# Patient Record
Sex: FEMALE | Race: WHITE | Employment: FULL TIME | URBAN - METROPOLITAN AREA
[De-identification: names, ages, dates, MRNs, and addresses within clinical notes are randomized per-mention and may not be internally consistent; named-entity substitution may affect disease eponyms.]

---

## 2021-10-27 ENCOUNTER — OFFICE VISIT (OUTPATIENT)
Dept: PRIMARY CARE CLINIC | Age: 31
End: 2021-10-27
Payer: COMMERCIAL

## 2021-10-27 VITALS
TEMPERATURE: 98 F | BODY MASS INDEX: 27.06 KG/M2 | HEIGHT: 70 IN | SYSTOLIC BLOOD PRESSURE: 118 MMHG | WEIGHT: 189 LBS | HEART RATE: 146 BPM | DIASTOLIC BLOOD PRESSURE: 78 MMHG | OXYGEN SATURATION: 98 %

## 2021-10-27 DIAGNOSIS — R10.30 LOWER ABDOMINAL PAIN: ICD-10-CM

## 2021-10-27 DIAGNOSIS — R10.30 LOWER ABDOMINAL PAIN: Primary | ICD-10-CM

## 2021-10-27 LAB
BILIRUBIN, POC: NORMAL
BLOOD URINE, POC: NORMAL
CLARITY, POC: CLEAR
COLOR, POC: YELLOW
GLUCOSE URINE, POC: NORMAL
KETONES, POC: NORMAL
LEUKOCYTE EST, POC: NORMAL
NITRITE, POC: NORMAL
PH, POC: 6
PROTEIN, POC: NORMAL
SPECIFIC GRAVITY, POC: >=1.03
UROBILINOGEN, POC: NORMAL

## 2021-10-27 PROCEDURE — 81002 URINALYSIS NONAUTO W/O SCOPE: CPT | Performed by: NURSE PRACTITIONER

## 2021-10-27 PROCEDURE — 99213 OFFICE O/P EST LOW 20 MIN: CPT | Performed by: NURSE PRACTITIONER

## 2021-10-27 RX ORDER — DESOGESTREL AND ETHINYL ESTRADIOL 0.15-0.03
KIT ORAL
COMMUNITY
Start: 2021-09-27

## 2021-10-27 RX ORDER — CETIRIZINE HYDROCHLORIDE 5 MG/1
TABLET ORAL
COMMUNITY

## 2021-10-27 NOTE — PROGRESS NOTES
Chief Complaint:       Abdominal Pain (bloating and lower abd pain this AM)      History of Present Illness   Source of history provided by:  patient. Ghazala Melvin is a 36 y.o. old female who has a past medical history of: History reviewed. No pertinent past medical history. Presents to the [facility] care for complaints of abdominal cramping while going to the bathroom at work today. She does have a history of pelvic floor dysfunction. Reports that the pain is now resolved abdominal pain located over the bladder region which began earlier today. Her next menstrual cycle is supposed to start in 3-4 days. Describes the pain as sharp and debilitating. States the pain does not radiate. Reports associated nausea, but denies any vomiting or diarrhea. Has tried taking pamprin OTC without symptomatic relief. Denies any fever, chills, CP, SOB, dysuria, hematuria, change in stool color/consistency, HA, sore throat, rash, or lethargy. ROS    Unless otherwise stated in this report or unable to obtain because of the patient's clinical or mental status as evidenced by the medical record, this patients's positive and negative responses for Review of Systems, constitutional, psych, eyes, ENT, cardiovascular, respiratory, gastrointestinal, neurological, genitourinary, musculoskeletal, integument systems and systems related to the presenting problem are either stated in the preceding or were not pertinent or were negative for the symptoms and/or complaints related to the medical problem. Past Medical History: History reviewed. No pertinent surgical history. Social History:  reports that she has never smoked. She has never used smokeless tobacco.  Family History: family history is not on file.    Allergies: Amoxicillin, Cefdinir, Doxycycline, Azithromycin, Nystatin, and Prednisone    Physical Exam         VS:  /78   Pulse 146   Temp 98 °F (36.7 °C)   Ht 5' 10\" (1.778 m)   Wt 189 lb (85.7 kg)   LMP 09/29/2021

## 2021-10-28 LAB — URINE CULTURE, ROUTINE: NORMAL

## 2021-10-30 LAB — URINE CULTURE, ROUTINE: NORMAL

## 2021-12-03 ENCOUNTER — OFFICE VISIT (OUTPATIENT)
Dept: PRIMARY CARE CLINIC | Age: 31
End: 2021-12-03
Payer: COMMERCIAL

## 2021-12-03 ENCOUNTER — HOSPITAL ENCOUNTER (OUTPATIENT)
Dept: GENERAL RADIOLOGY | Age: 31
Discharge: HOME | End: 2021-12-05
Payer: COMMERCIAL

## 2021-12-03 ENCOUNTER — HOSPITAL ENCOUNTER (OUTPATIENT)
Age: 31
Discharge: HOME | End: 2021-12-03
Payer: COMMERCIAL

## 2021-12-03 VITALS
HEART RATE: 100 BPM | TEMPERATURE: 98 F | DIASTOLIC BLOOD PRESSURE: 80 MMHG | WEIGHT: 189 LBS | HEIGHT: 70 IN | OXYGEN SATURATION: 98 % | BODY MASS INDEX: 27.06 KG/M2 | SYSTOLIC BLOOD PRESSURE: 130 MMHG

## 2021-12-03 DIAGNOSIS — J45.31 MILD PERSISTENT ASTHMA WITH ACUTE EXACERBATION: Primary | ICD-10-CM

## 2021-12-03 DIAGNOSIS — J45.31 MILD PERSISTENT ASTHMA WITH ACUTE EXACERBATION: ICD-10-CM

## 2021-12-03 PROCEDURE — 71046 X-RAY EXAM CHEST 2 VIEWS: CPT

## 2021-12-03 PROCEDURE — 99213 OFFICE O/P EST LOW 20 MIN: CPT | Performed by: FAMILY MEDICINE

## 2021-12-03 RX ORDER — ALBUTEROL SULFATE 90 UG/1
2 AEROSOL, METERED RESPIRATORY (INHALATION) EVERY 4 HOURS PRN
Qty: 18 G | Refills: 1 | Status: SHIPPED | OUTPATIENT
Start: 2021-12-03

## 2021-12-03 RX ORDER — DEXAMETHASONE 1 MG
3 TABLET ORAL 2 TIMES DAILY WITH MEALS
Qty: 24 TABLET | Refills: 0 | Status: SHIPPED | OUTPATIENT
Start: 2021-12-03 | End: 2021-12-07

## 2021-12-03 RX ORDER — DEXAMETHASONE 1 MG
3 TABLET ORAL 2 TIMES DAILY WITH MEALS
Qty: 24 TABLET | Refills: 0 | Status: SHIPPED
Start: 2021-12-03 | End: 2021-12-03 | Stop reason: SDUPTHER

## 2021-12-03 RX ORDER — FOLIC ACID 1 MG/1
1 TABLET ORAL DAILY
COMMUNITY

## 2021-12-03 NOTE — PROGRESS NOTES
(All laboratory and radiology results have been personally reviewed by myself)  Labs:  No results found for this visit on 12/03/21. Imaging: All Radiology results interpreted by Radiologist unless otherwise noted. No results found. Medical Decision Making   Pt non-toxic, in no apparent distress and stable at time of discharge. Assessment/Plan   Cuca was seen today for cough, congestion, shortness of breath and wheezing. Diagnoses and all orders for this visit:    Mild persistent asthma with acute exacerbation  -     XR CHEST (2 VW); Future  -     albuterol sulfate HFA (VENTOLIN HFA) 108 (90 Base) MCG/ACT inhaler; Inhale 2 puffs into the lungs every 4 hours as needed for Wheezing or Shortness of Breath . OK substitute      CXR r/o PNA, doubt clinically. Qvar 2-3 times per day. Reviewed albuterol use PRN and to use 3-5 times per day while sick. She will start these and follow up on CXR report. She will call if this is not helping to trial steroid (medrol or decadron). She'd like to wait on steroid script due to previous adverse reaction to prednisone. Increase fluids and rest. Symptomatic relief discussed including Tylenol prn pain/fever. Schedule f/u with PCP in 7-10 days if symptoms persist. ED sooner if symptoms worsen or change. ED immediately with high or refractory fever, progressive SOB, dyspnea, CP, calf pain/swelling, shaking chills, vomiting, abdominal pain, lethargy, flank pain, or decreased urinary output. Pt verbalizes understanding and is in agreement with plan of care. All questions answered. Matt Wills MD    This visit was provided as a focused evaluation during the COVID -19 pandemic/national emergency. A comprehensive review of all previous patient history and testing was not conducted. Pertinent findings were elicited during the visit. *NOTE: This report was transcribed using voice recognition software.  Every effort was made to ensure accuracy; however, inadvertent computerized transcription errors may be present.

## 2021-12-03 NOTE — RESULT ENCOUNTER NOTE
No evidence of lobar/bacterial pneumonia. There is bronchial inflammation which we discussed in visit. Would be reasonable to use the inhalers as reviewed and if she wants I can send one of the steroids to have on hand to try if she feels she's getting worse.

## 2023-03-08 ENCOUNTER — OFFICE VISIT (OUTPATIENT)
Dept: PRIMARY CARE | Facility: CLINIC | Age: 33
End: 2023-03-08
Payer: COMMERCIAL

## 2023-03-08 VITALS
HEART RATE: 98 BPM | HEIGHT: 70 IN | DIASTOLIC BLOOD PRESSURE: 68 MMHG | OXYGEN SATURATION: 99 % | SYSTOLIC BLOOD PRESSURE: 116 MMHG | BODY MASS INDEX: 30.06 KG/M2 | TEMPERATURE: 97.7 F | WEIGHT: 210 LBS

## 2023-03-08 DIAGNOSIS — R04.2 BLOOD-STREAKED SPUTUM: ICD-10-CM

## 2023-03-08 DIAGNOSIS — J02.9 SORE THROAT: Primary | ICD-10-CM

## 2023-03-08 DIAGNOSIS — J45.30 MILD PERSISTENT ASTHMA WITHOUT COMPLICATION (HHS-HCC): ICD-10-CM

## 2023-03-08 PROCEDURE — 1036F TOBACCO NON-USER: CPT | Performed by: FAMILY MEDICINE

## 2023-03-08 PROCEDURE — 99213 OFFICE O/P EST LOW 20 MIN: CPT | Performed by: FAMILY MEDICINE

## 2023-03-08 RX ORDER — ALBUTEROL SULFATE 90 UG/1
2 AEROSOL, METERED RESPIRATORY (INHALATION) EVERY 4 HOURS PRN
COMMUNITY
End: 2024-02-28 | Stop reason: SDUPTHER

## 2023-03-08 RX ORDER — BECLOMETHASONE DIPROPIONATE HFA 80 UG/1
2 AEROSOL, METERED RESPIRATORY (INHALATION) DAILY
COMMUNITY
Start: 2021-02-01 | End: 2024-04-08 | Stop reason: WASHOUT

## 2023-03-08 RX ORDER — FOLIC ACID 1 MG/1
1 TABLET ORAL DAILY
COMMUNITY

## 2023-03-08 RX ORDER — MILK THISTLE 150 MG
1 CAPSULE ORAL DAILY PRN
COMMUNITY
End: 2024-02-05 | Stop reason: WASHOUT

## 2023-03-08 RX ORDER — MULTIVITAMIN
1 TABLET ORAL DAILY
COMMUNITY
End: 2024-02-05

## 2023-03-08 ASSESSMENT — PATIENT HEALTH QUESTIONNAIRE - PHQ9
1. LITTLE INTEREST OR PLEASURE IN DOING THINGS: NOT AT ALL
SUM OF ALL RESPONSES TO PHQ9 QUESTIONS 1 AND 2: 0
2. FEELING DOWN, DEPRESSED OR HOPELESS: NOT AT ALL

## 2023-03-08 ASSESSMENT — ENCOUNTER SYMPTOMS
FATIGUE: 1
SINUS PAIN: 0
CHILLS: 0
ACTIVITY CHANGE: 1
FEVER: 0

## 2023-03-08 NOTE — PROGRESS NOTES
"Subjective   Patient ID: Marisol Rebolledo is a 32 y.o. female who presents for Nasal Congestion (Woke up yesterday with blood in her phlegm, no cough).  She had blood in her phlegm x2 just first thing in the morning.  Small amount.  States her throat is very sore in the morning and throughout the day.  Sore all the way in the tracheal area.  No shortness of breath or aggravation of asthma.  Denies fever chills.  Does admit to feeling somewhat fatigued.  She had some questions concern regarding use of her inhalers if she would become pregnant.  Denies nasal congestion or epistaxis.  States she did see ENT in the past and told she was fine    HPI     Review of Systems   Constitutional:  Positive for activity change and fatigue. Negative for chills and fever.   HENT:  Negative for congestion and sinus pain.        Objective   /68   Pulse 98   Temp 36.5 °C (97.7 °F)   Ht 1.778 m (5' 10\")   Wt 95.3 kg (210 lb)   SpO2 99%   BMI 30.13 kg/m²     Physical Exam  Constitutional:       Appearance: Normal appearance. She is obese.   HENT:      Head: Normocephalic.      Right Ear: Tympanic membrane and ear canal normal.      Left Ear: Tympanic membrane and ear canal normal.      Nose: No congestion or rhinorrhea.      Comments: Nasal passages are very narrow it does appear that the septum is deviated to the left.  No obvious area of bleeding.  Pharynx is very irritated and erythematous tonsils 1+ no exudate     Mouth/Throat:      Mouth: Mucous membranes are moist.      Pharynx: Oropharynx is clear. Posterior oropharyngeal erythema present. No oropharyngeal exudate.   Cardiovascular:      Rate and Rhythm: Normal rate and regular rhythm.      Heart sounds: No murmur heard.  Pulmonary:      Effort: No respiratory distress.      Breath sounds: No wheezing, rhonchi or rales.   Musculoskeletal:      Cervical back: No rigidity.   Lymphadenopathy:      Cervical: No cervical adenopathy.   Neurological:      Mental Status: She is " alert.         Assessment/Plan   Problem List Items Addressed This Visit    None  Visit Diagnoses       Sore throat    -  Primary    Blood-streaked sputum        Mild persistent asthma without complication            Patient with significant sore throat.  Most likely viral in etiology versus possibly just indoor dry air patient very old house built in the 1920s forced air heat.  Encourage use of humidifier closer to the bed.  Aggressive use of nasal saline.  If she continues to get blood in her sputum or would develop shortness of breath should follow-up.  Would initially consider seeing ENT for nasopharyngoscopy do not feel it is lung related.   Discussed use of her inhalers in pregnancy would continue very small amount of medicine and very important to control asthma during pregnancy.

## 2023-03-10 ENCOUNTER — TELEPHONE (OUTPATIENT)
Dept: PRIMARY CARE | Facility: CLINIC | Age: 33
End: 2023-03-10

## 2023-03-10 NOTE — TELEPHONE ENCOUNTER
Pt called said that she went outside she thought she was better and now she has developed a dry cough. She was wondering if taking a steroid would help with it she has some at her house.

## 2023-03-10 NOTE — TELEPHONE ENCOUNTER
Called and informed yes steroid would probably help she has asthma. Her bleeding has resolved. Otherwise feels well. Spent a lot of time outside .

## 2023-03-13 ENCOUNTER — TELEPHONE (OUTPATIENT)
Dept: PRIMARY CARE | Facility: CLINIC | Age: 33
End: 2023-03-13

## 2023-03-13 DIAGNOSIS — J01.00 ACUTE NON-RECURRENT MAXILLARY SINUSITIS: Primary | ICD-10-CM

## 2023-03-13 RX ORDER — DEXAMETHASONE 4 MG/1
4 TABLET ORAL 2 TIMES DAILY
Qty: 10 TABLET | Refills: 0 | Status: SHIPPED | OUTPATIENT
Start: 2023-03-13 | End: 2023-05-04 | Stop reason: SDUPTHER

## 2023-03-13 RX ORDER — LEVOFLOXACIN 500 MG/1
500 TABLET, FILM COATED ORAL DAILY
Qty: 10 TABLET | Refills: 0 | Status: SHIPPED | OUTPATIENT
Start: 2023-03-13 | End: 2023-03-23 | Stop reason: SINTOL

## 2023-03-13 NOTE — TELEPHONE ENCOUNTER
Called and discussed with patient allergies to all antibiotic groups other than danii quinolones.  She states she has done levofloxacin in the past.  She is informed using that with steroids significant increased risk of tendon rupture.  Patient understands she will take.  She has almost completely lost her voice states she is not coughing up abundant green material.     Called discussed with her Rx sent

## 2023-03-23 ENCOUNTER — TELEMEDICINE (OUTPATIENT)
Dept: PRIMARY CARE | Facility: CLINIC | Age: 33
End: 2023-03-23
Payer: COMMERCIAL

## 2023-03-23 DIAGNOSIS — T50.905A MEDICATION REACTION, INITIAL ENCOUNTER: ICD-10-CM

## 2023-03-23 DIAGNOSIS — M79.605 LEG PAIN, BILATERAL: Primary | ICD-10-CM

## 2023-03-23 DIAGNOSIS — M79.604 LEG PAIN, BILATERAL: Primary | ICD-10-CM

## 2023-03-23 PROCEDURE — 99213 OFFICE O/P EST LOW 20 MIN: CPT | Performed by: FAMILY MEDICINE

## 2023-03-24 NOTE — PROGRESS NOTES
Subjective   Virtual visit   18 minutes visit   Called back after visit 2.5 minutes   Patient ID: Marisol Rebolledo is a 32 y.o. female who presents for No chief complaint on file..    HPI virtual visit with patient she actually was sitting in a [employer] parking lot when she called  Onset of severe pain yesterday seem to improve some during the day last night however became severe again  Primarily knee down both legs pain and muscles tendons  Patient note is on levofloxacin she does take her dose around 10 10:30 at night pain seems to be the worse early in the morning she did take her dose last night  She denies any rash or swelling  She actually was not able to walk this morning  Denies that her swollen  Just painful  She denies any shortness of breath no swelling in her lips no wheezing  Does have history of asthma    Review of Systems    Objective   There were no vitals taken for this visit.    Physical Exam    Assessment/Plan   Problem List Items Addressed This Visit    None  Visit Diagnoses       Leg pain, bilateral    -  Primary    Medication reaction, initial encounter            Discussed with patient this is felt to be reaction to levofloxacin.  Encouraged her to try taking ibuprofen 3 of them every 6 hours with food.  Also should consider taking Prilosec 20 mg/day needed for GI protection.  Informed her may take a while for symptoms to improve she is to call if any other change occurs particularly rash fever swelling.   Highly encouraged her to have visit with allergist to be tested as she is allergic to all common antibiotics that could be used.

## 2023-05-04 ENCOUNTER — TELEPHONE (OUTPATIENT)
Dept: PRIMARY CARE | Facility: CLINIC | Age: 33
End: 2023-05-04

## 2023-05-04 DIAGNOSIS — J45.30 MILD PERSISTENT ASTHMA WITHOUT COMPLICATION (HHS-HCC): Primary | ICD-10-CM

## 2023-05-04 DIAGNOSIS — B37.31 CANDIDA VAGINITIS: ICD-10-CM

## 2023-05-04 DIAGNOSIS — J45.21 MILD INTERMITTENT ASTHMA WITH EXACERBATION (HHS-HCC): ICD-10-CM

## 2023-05-04 RX ORDER — DEXAMETHASONE 4 MG/1
4 TABLET ORAL 2 TIMES DAILY
Qty: 10 TABLET | Refills: 0 | Status: SHIPPED | OUTPATIENT
Start: 2023-05-04 | End: 2023-05-15 | Stop reason: ALTCHOICE

## 2023-05-04 NOTE — TELEPHONE ENCOUNTER
Pt has no insurance and can't afford a visit or a phone visit. Wants to let you know that she is allergic to to antibiotics, can't breathe, severe asthma and has been off work for 3 days.  She wants you to call and talk with her.

## 2023-05-11 PROBLEM — B37.0 ORAL THRUSH: Status: ACTIVE | Noted: 2023-05-11

## 2023-05-11 RX ORDER — FLUCONAZOLE 150 MG/1
150 TABLET ORAL ONCE
Qty: 2 TABLET | Refills: 0 | Status: SHIPPED | OUTPATIENT
Start: 2023-05-11 | End: 2023-05-15 | Stop reason: SDUPTHER

## 2023-05-15 ENCOUNTER — OFFICE VISIT (OUTPATIENT)
Dept: PRIMARY CARE | Facility: CLINIC | Age: 33
End: 2023-05-15
Payer: COMMERCIAL

## 2023-05-15 VITALS
WEIGHT: 211 LBS | DIASTOLIC BLOOD PRESSURE: 72 MMHG | OXYGEN SATURATION: 98 % | HEART RATE: 88 BPM | BODY MASS INDEX: 30.21 KG/M2 | SYSTOLIC BLOOD PRESSURE: 128 MMHG | HEIGHT: 70 IN

## 2023-05-15 DIAGNOSIS — J45.21 MILD INTERMITTENT ASTHMA WITH EXACERBATION (HHS-HCC): ICD-10-CM

## 2023-05-15 DIAGNOSIS — R49.0 HOARSENESS OF VOICE: Primary | ICD-10-CM

## 2023-05-15 PROBLEM — N89.8 VAGINAL DISCHARGE: Status: RESOLVED | Noted: 2023-05-15 | Resolved: 2023-05-15

## 2023-05-15 PROBLEM — R06.02 SHORTNESS OF BREATH: Status: RESOLVED | Noted: 2023-05-15 | Resolved: 2023-05-15

## 2023-05-15 PROBLEM — N92.0 HEAVY PERIODS: Status: RESOLVED | Noted: 2023-05-15 | Resolved: 2023-05-15

## 2023-05-15 PROBLEM — J45.30 MILD PERSISTENT ASTHMA (HHS-HCC): Status: ACTIVE | Noted: 2023-05-15

## 2023-05-15 PROBLEM — R23.2 FACIAL FLUSHING: Status: RESOLVED | Noted: 2023-05-15 | Resolved: 2023-05-15

## 2023-05-15 PROBLEM — M41.80 DEXTROSCOLIOSIS: Status: ACTIVE | Noted: 2023-05-15

## 2023-05-15 PROBLEM — R07.9 CHEST PAIN: Status: RESOLVED | Noted: 2023-05-15 | Resolved: 2023-05-15

## 2023-05-15 PROBLEM — J45.909 ASTHMA (HHS-HCC): Status: ACTIVE | Noted: 2023-05-15

## 2023-05-15 PROBLEM — H53.129 SCINTILLATING SCOTOMA: Status: RESOLVED | Noted: 2023-05-15 | Resolved: 2023-05-15

## 2023-05-15 PROBLEM — J20.9 ACUTE BRONCHITIS: Status: RESOLVED | Noted: 2023-05-15 | Resolved: 2023-05-15

## 2023-05-15 PROBLEM — H91.90 HEARING LOSS: Status: RESOLVED | Noted: 2023-05-15 | Resolved: 2023-05-15

## 2023-05-15 PROBLEM — N94.6 MENSTRUAL PAIN: Status: RESOLVED | Noted: 2023-05-15 | Resolved: 2023-05-15

## 2023-05-15 PROBLEM — R27.8 MUSCULAR INCOORDINATION: Status: RESOLVED | Noted: 2019-11-13 | Resolved: 2023-05-15

## 2023-05-15 PROBLEM — M26.629 TMJ SYNDROME: Status: ACTIVE | Noted: 2023-05-15

## 2023-05-15 PROBLEM — M79.675 PAIN OF TOE OF LEFT FOOT: Status: RESOLVED | Noted: 2023-05-15 | Resolved: 2023-05-15

## 2023-05-15 PROBLEM — M21.42 PES PLANUS OF LEFT FOOT: Status: RESOLVED | Noted: 2023-05-15 | Resolved: 2023-05-15

## 2023-05-15 PROBLEM — R19.8 ALTERNATING CONSTIPATION AND DIARRHEA: Status: RESOLVED | Noted: 2023-05-15 | Resolved: 2023-05-15

## 2023-05-15 PROBLEM — R04.0 BLEEDING NOSE: Status: RESOLVED | Noted: 2023-05-15 | Resolved: 2023-05-15

## 2023-05-15 PROBLEM — R42 VERTIGO: Status: RESOLVED | Noted: 2023-05-15 | Resolved: 2023-05-15

## 2023-05-15 PROBLEM — B35.1 ONYCHOMYCOSIS OF TOENAIL: Status: RESOLVED | Noted: 2023-05-15 | Resolved: 2023-05-15

## 2023-05-15 PROBLEM — M62.838 SPASM OF MUSCLE: Status: RESOLVED | Noted: 2019-11-13 | Resolved: 2023-05-15

## 2023-05-15 PROBLEM — R42 DIZZINESS: Status: RESOLVED | Noted: 2023-05-15 | Resolved: 2023-05-15

## 2023-05-15 PROBLEM — B37.9 YEAST INFECTION: Status: RESOLVED | Noted: 2023-05-15 | Resolved: 2023-05-15

## 2023-05-15 PROBLEM — N64.4 BREAST TENDERNESS IN FEMALE: Status: RESOLVED | Noted: 2023-05-15 | Resolved: 2023-05-15

## 2023-05-15 PROBLEM — J30.9 ALLERGIC RHINITIS: Status: ACTIVE | Noted: 2023-05-15

## 2023-05-15 PROBLEM — S29.011A PECTORALIS MUSCLE STRAIN: Status: RESOLVED | Noted: 2023-05-15 | Resolved: 2023-05-15

## 2023-05-15 PROBLEM — H92.02 LEFT EAR PAIN: Status: RESOLVED | Noted: 2023-05-15 | Resolved: 2023-05-15

## 2023-05-15 PROBLEM — R10.9 LEFT FLANK PAIN: Status: RESOLVED | Noted: 2023-05-15 | Resolved: 2023-05-15

## 2023-05-15 PROBLEM — Q66.51 CONGENITAL PES PLANUS OF RIGHT FOOT: Status: ACTIVE | Noted: 2023-05-15

## 2023-05-15 PROBLEM — H93.13 BILATERAL TINNITUS: Status: ACTIVE | Noted: 2023-05-15

## 2023-05-15 PROBLEM — R05.9 COUGH: Status: RESOLVED | Noted: 2023-05-15 | Resolved: 2023-05-15

## 2023-05-15 PROBLEM — H81.10 BENIGN PAROXYSMAL POSITIONAL VERTIGO: Status: ACTIVE | Noted: 2023-05-15

## 2023-05-15 PROCEDURE — 1036F TOBACCO NON-USER: CPT | Performed by: FAMILY MEDICINE

## 2023-05-15 PROCEDURE — 99213 OFFICE O/P EST LOW 20 MIN: CPT | Performed by: FAMILY MEDICINE

## 2023-05-15 RX ORDER — FLUCONAZOLE 150 MG/1
150 TABLET ORAL ONCE
Qty: 2 TABLET | Refills: 0 | Status: SHIPPED | OUTPATIENT
Start: 2023-05-15 | End: 2023-05-16 | Stop reason: ALTCHOICE

## 2023-05-15 RX ORDER — DEXAMETHASONE 4 MG/1
4 TABLET ORAL 2 TIMES DAILY
Qty: 10 TABLET | Refills: 0 | Status: SHIPPED | OUTPATIENT
Start: 2023-05-15 | End: 2024-02-05 | Stop reason: WASHOUT

## 2023-05-15 NOTE — PROGRESS NOTES
Subjective   Patient ID: Marisol Rebolledo is a 32 y.o. female who presents for no voice (Complaining of not having a voice, for about 3 weeks ).  HPI  Has been hoarse for 3 weeks   Started with a cold  Still coughing up mucus  ? SOB  No CP, ST, ear pain  Fever, chills resolved  No runny/stuffy nose  No nausea, diarrhea  Coughed so much that vomited  Home COVID test negative  Taking vitamin C    Current Outpatient Medications:     albuterol 90 mcg/actuation inhaler, Inhale 2 puffs every 4 hours if needed., Disp: , Rfl:     dexAMETHasone (Decadron) 4 mg tablet, Take 1 tablet (4 mg) by mouth 2 times a day for 5 days., Disp: 10 tablet, Rfl: 0    folic acid (Folvite) 1 mg tablet, Take 1 tablet (1 mg) by mouth once daily., Disp: , Rfl:     multivitamin tablet, Take 1 tablet by mouth once daily., Disp: , Rfl:     OREGANO OIL ORAL, Take 2 drops by mouth once daily as needed., Disp: , Rfl:     quercetin 500 mg capsule, Take 1 capsule by mouth once daily as needed., Disp: , Rfl:     Qvar RediHaler 80 mcg/actuation inhaler, Inhale 2 Inhalations once daily., Disp: , Rfl:    Past Surgical History:   Procedure Laterality Date    OTHER SURGICAL HISTORY  12/03/2014    Alveoloplasty With Tooth Extraction 1-3 Teeth Per Quadrant    OTHER SURGICAL HISTORY  07/30/2020    Endoscopy      Past Medical History:   Diagnosis Date    Acute maxillary sinusitis, unspecified 06/09/2016    Acute maxillary sinusitis    Acute upper respiratory infection, unspecified 10/28/2019    Viral URI with cough    Breast tenderness in female 05/15/2023    Chest pain 05/15/2023    Dizziness 05/15/2023    Facial flushing 05/15/2023    Heavy periods 05/15/2023    Hordeolum externum left lower eyelid 06/10/2019    Hordeolum externum of left lower eyelid    Left ear pain 05/15/2023    Left flank pain 05/15/2023    Mild intermittent asthma with exacerbation 05/15/2023    Onychomycosis of toenail 05/15/2023    Other conditions influencing health status     No  "significant past medical history    Other specified disorders of eustachian tube, left ear 03/14/2020    Dysfunction of left eustachian tube    Pectoralis muscle strain 05/15/2023    Pelvic and perineal pain 04/09/2019    Pelvic pain in female    Personal history of other diseases of the respiratory system     History of sore throat    Personal history of other specified conditions 04/09/2019    History of abdominal pain    Pes planus of left foot 05/15/2023    Pneumonia, unspecified organism 03/01/2016    Community acquired pneumonia    Shortness of breath 05/15/2023    Spasm of muscle 11/13/2019    Unspecified nonsuppurative otitis media, unspecified ear 01/14/2019    Middle ear effusion    Vaginal discharge 05/15/2023    Yeast infection 05/15/2023     Social History     Tobacco Use    Smoking status: Never    Smokeless tobacco: Never   Substance Use Topics    Alcohol use: Yes     Alcohol/week: 1.0 standard drink of alcohol     Types: 1 Glasses of wine per week      No family history on file.   Review of Systems    Objective   /72   Pulse 88   Ht 1.778 m (5' 10\")   Wt 95.7 kg (211 lb)   SpO2 98%   BMI 30.28 kg/m²    Physical Exam  Vitals and nursing note reviewed.   Constitutional:       General: She is not in acute distress.     Appearance: Normal appearance.   HENT:      Head: Normocephalic and atraumatic.      Right Ear: Tympanic membrane, ear canal and external ear normal.      Left Ear: Tympanic membrane, ear canal and external ear normal.      Nose: Nose normal.      Mouth/Throat:      Mouth: Mucous membranes are moist.      Pharynx: Oropharynx is clear.   Eyes:      Extraocular Movements: Extraocular movements intact.      Pupils: Pupils are equal, round, and reactive to light.   Neck:      Vascular: No carotid bruit.      Comments: Hoarse voice  Cardiovascular:      Rate and Rhythm: Normal rate and regular rhythm.      Pulses: Normal pulses.      Heart sounds: Normal heart sounds. No murmur " heard.  Pulmonary:      Effort: Pulmonary effort is normal.      Breath sounds: Normal breath sounds.   Abdominal:      Palpations: There is no mass.   Musculoskeletal:      Cervical back: Normal range of motion and neck supple.   Lymphadenopathy:      Cervical: No cervical adenopathy.   Skin:     Capillary Refill: Capillary refill takes less than 2 seconds.   Neurological:      General: No focal deficit present.      Mental Status: She is alert and oriented to person, place, and time.   Psychiatric:         Mood and Affect: Mood normal.         Behavior: Behavior normal.         Assessment/Plan   Problem List Items Addressed This Visit    None  Visit Diagnoses       Hoarseness of voice    -  Primary    Relevant Orders    Referral to ENT    Mild intermittent asthma with exacerbation        Relevant Medications    dexAMETHasone (Decadron) 4 mg tablet    Candida vaginitis            To ENT about hoarse voice and medrol given    Patient understands and agrees with treatment plan    Estuardo Dutton, DO

## 2023-06-27 ENCOUNTER — TELEPHONE (OUTPATIENT)
Dept: PRIMARY CARE | Facility: CLINIC | Age: 33
End: 2023-06-27

## 2023-06-27 NOTE — TELEPHONE ENCOUNTER
Qvar is no longer does patient assistants and she cannot afford it.  She wants to know what she should do?  She cannot take a different inhaler.  She said she will like an rx to [facility] in [place].  Do we have any other patient assistant programs that help Qvar.  Is there generic Qvar?

## 2023-10-08 PROBLEM — J38.2 VOCAL NODULES IN ADULTS: Status: ACTIVE | Noted: 2023-10-08

## 2023-10-08 PROBLEM — K21.9 GERD (GASTROESOPHAGEAL REFLUX DISEASE): Status: ACTIVE | Noted: 2023-10-08

## 2023-10-08 PROBLEM — R07.9 CHEST PAIN: Status: ACTIVE | Noted: 2023-10-08

## 2023-10-08 PROBLEM — N64.4 BREAST TENDERNESS IN FEMALE: Status: ACTIVE | Noted: 2023-10-08

## 2023-10-08 PROBLEM — R49.0 HOARSENESS: Status: ACTIVE | Noted: 2023-10-08

## 2023-10-08 PROBLEM — R10.9 ABDOMINAL PAIN: Status: ACTIVE | Noted: 2023-10-08

## 2023-10-08 PROBLEM — R49.0 MUSCLE TENSION DYSPHONIA: Status: ACTIVE | Noted: 2023-10-08

## 2023-10-08 PROBLEM — R05.3 CHRONIC COUGH: Status: ACTIVE | Noted: 2023-10-08

## 2023-10-08 PROBLEM — R49.8 WEAKNESS OF VOICE: Status: ACTIVE | Noted: 2023-10-08

## 2023-10-08 PROBLEM — H69.93 DYSFUNCTION OF BOTH EUSTACHIAN TUBES: Status: ACTIVE | Noted: 2023-10-08

## 2023-10-08 PROBLEM — R19.8 ALTERNATING CONSTIPATION AND DIARRHEA: Status: ACTIVE | Noted: 2023-10-08

## 2023-10-08 RX ORDER — OMEPRAZOLE 20 MG/1
20 CAPSULE, DELAYED RELEASE ORAL 2 TIMES DAILY
COMMUNITY
Start: 2023-05-18 | End: 2024-02-05 | Stop reason: WASHOUT

## 2023-10-08 RX ORDER — METRONIDAZOLE 7.5 MG/G
GEL VAGINAL
COMMUNITY
Start: 2022-11-07 | End: 2024-02-05 | Stop reason: WASHOUT

## 2023-10-08 RX ORDER — FLUTICASONE PROPIONATE 50 MCG
2 SPRAY, SUSPENSION (ML) NASAL DAILY
COMMUNITY
Start: 2023-01-23 | End: 2024-02-05 | Stop reason: WASHOUT

## 2023-10-10 ENCOUNTER — APPOINTMENT (OUTPATIENT)
Dept: SPEECH THERAPY | Facility: CLINIC | Age: 33
End: 2023-10-10

## 2023-10-24 ENCOUNTER — APPOINTMENT (OUTPATIENT)
Dept: SPEECH THERAPY | Facility: CLINIC | Age: 33
End: 2023-10-24

## 2024-02-05 ENCOUNTER — HOSPITAL ENCOUNTER (OUTPATIENT)
Dept: RADIOLOGY | Facility: HOSPITAL | Age: 34
Discharge: HOME | End: 2024-02-05
Payer: COMMERCIAL

## 2024-02-05 ENCOUNTER — INITIAL PRENATAL (OUTPATIENT)
Dept: OBSTETRICS AND GYNECOLOGY | Facility: CLINIC | Age: 34
End: 2024-02-05
Payer: COMMERCIAL

## 2024-02-05 ENCOUNTER — LAB (OUTPATIENT)
Dept: LAB | Facility: LAB | Age: 34
End: 2024-02-05
Payer: COMMERCIAL

## 2024-02-05 ENCOUNTER — TELEPHONE (OUTPATIENT)
Dept: OBSTETRICS AND GYNECOLOGY | Facility: CLINIC | Age: 34
End: 2024-02-05
Payer: COMMERCIAL

## 2024-02-05 DIAGNOSIS — N93.9 ABNORMAL UTERINE BLEEDING (AUB): ICD-10-CM

## 2024-02-05 DIAGNOSIS — O20.0 THREATENED ABORTION IN EARLY PREGNANCY (HHS-HCC): ICD-10-CM

## 2024-02-05 DIAGNOSIS — Z01.411 ENCOUNTER FOR GYNECOLOGICAL EXAMINATION WITH ABNORMAL FINDING: Primary | ICD-10-CM

## 2024-02-05 LAB
ABO GROUP (TYPE) IN BLOOD: NORMAL
ANTIBODY SCREEN: NORMAL
B-HCG SERPL-ACNC: ABNORMAL MIU/ML
ERYTHROCYTE [DISTWIDTH] IN BLOOD BY AUTOMATED COUNT: 13.2 % (ref 11.5–14.5)
HCT VFR BLD AUTO: 37.6 % (ref 36–46)
HGB BLD-MCNC: 12.2 G/DL (ref 12–16)
MCH RBC QN AUTO: 28 PG (ref 26–34)
MCHC RBC AUTO-ENTMCNC: 32.4 G/DL (ref 32–36)
MCV RBC AUTO: 86 FL (ref 80–100)
NRBC BLD-RTO: 0 /100 WBCS (ref 0–0)
PLATELET # BLD AUTO: 280 X10*3/UL (ref 150–450)
POC APPEARANCE, URINE: CLEAR
POC BILIRUBIN, URINE: NEGATIVE
POC BLOOD, URINE: NEGATIVE
POC COLOR, URINE: YELLOW
POC GLUCOSE, URINE: NEGATIVE MG/DL
POC KETONES, URINE: ABNORMAL MG/DL
POC LEUKOCYTES, URINE: NEGATIVE
POC NITRITE,URINE: NEGATIVE
POC PH, URINE: 5 PH
POC PROTEIN, URINE: NEGATIVE MG/DL
POC SPECIFIC GRAVITY, URINE: 1.01
PREGNANCY TEST URINE, POC: POSITIVE
RBC # BLD AUTO: 4.36 X10*6/UL (ref 4–5.2)
RH FACTOR (ANTIGEN D): NORMAL
WBC # BLD AUTO: 8.4 X10*3/UL (ref 4.4–11.3)

## 2024-02-05 PROCEDURE — 86850 RBC ANTIBODY SCREEN: CPT

## 2024-02-05 PROCEDURE — 86901 BLOOD TYPING SEROLOGIC RH(D): CPT

## 2024-02-05 PROCEDURE — 85027 COMPLETE CBC AUTOMATED: CPT

## 2024-02-05 PROCEDURE — 86900 BLOOD TYPING SEROLOGIC ABO: CPT

## 2024-02-05 PROCEDURE — 81025 URINE PREGNANCY TEST: CPT | Performed by: NURSE PRACTITIONER

## 2024-02-05 PROCEDURE — 84702 CHORIONIC GONADOTROPIN TEST: CPT

## 2024-02-05 PROCEDURE — 76801 OB US < 14 WKS SINGLE FETUS: CPT

## 2024-02-05 PROCEDURE — 81003 URINALYSIS AUTO W/O SCOPE: CPT | Performed by: NURSE PRACTITIONER

## 2024-02-05 PROCEDURE — 87086 URINE CULTURE/COLONY COUNT: CPT

## 2024-02-05 PROCEDURE — 76815 OB US LIMITED FETUS(S): CPT | Performed by: RADIOLOGY

## 2024-02-05 PROCEDURE — 0500F INITIAL PRENATAL CARE VISIT: CPT | Performed by: NURSE PRACTITIONER

## 2024-02-05 PROCEDURE — 87800 DETECT AGNT MULT DNA DIREC: CPT

## 2024-02-05 PROCEDURE — 76817 TRANSVAGINAL US OBSTETRIC: CPT | Performed by: RADIOLOGY

## 2024-02-05 PROCEDURE — 36415 COLL VENOUS BLD VENIPUNCTURE: CPT

## 2024-02-05 NOTE — PROGRESS NOTES
LMP: 12/13/2023    Subjective   Marisol Rebolledo is a 33 y.o. female who is here for a routine exam. Periods are regular every 28-30 days, pt states she desired pregnancy and had a + UPT at home. New onset of spotting last night and pt concerned with the course of the pregnancy. She presents as a new pt to establish care today.     Current contraception: none    History of abnormal Pap smear: no  Family history of uterine or ovarian cancer: no  Regular self breast exam: no    History of abnormal mammogram: no  Family history of breast cancer: no    Last pap: 2021 WNL HPV negative     Review of Systems:    Constitutional: Negative.    HENT: Negative.     Eyes: Negative.    Respiratory: Negative.     Cardiovascular: Negative.    Gastrointestinal: Negative.    Endocrine: Negative.    Genitourinary: Negative.    Musculoskeletal: Negative.    Skin: Negative.    Allergic/Immunologic: Negative.    Neurological: Negative.    Hematological: Negative.    Psychiatric/Behavioral: Negative.       Past Medical History:   Diagnosis Date    Acute maxillary sinusitis, unspecified 06/09/2016    Acute maxillary sinusitis    Acute upper respiratory infection, unspecified 10/28/2019    Viral URI with cough    Asthma     Breast tenderness in female 05/15/2023    Chest pain 05/15/2023    Dizziness 05/15/2023    Facial flushing 05/15/2023    Heavy periods 05/15/2023    Hordeolum externum left lower eyelid 06/10/2019    Hordeolum externum of left lower eyelid    Left ear pain 05/15/2023    Left flank pain 05/15/2023    Mild intermittent asthma with exacerbation 05/15/2023    Onychomycosis of toenail 05/15/2023    Other conditions influencing health status     No significant past medical history    Other specified disorders of eustachian tube, left ear 03/14/2020    Dysfunction of left eustachian tube    Pectoralis muscle strain 05/15/2023    Pelvic and perineal pain 04/09/2019    Pelvic pain in female    Personal history of other diseases of  the respiratory system     History of sore throat    Personal history of other specified conditions 04/09/2019    History of abdominal pain    Pes planus of left foot 05/15/2023    Pneumonia, unspecified organism 03/01/2016    Community acquired pneumonia    Shortness of breath 05/15/2023    Spasm of muscle 11/13/2019    Unspecified nonsuppurative otitis media, unspecified ear 01/14/2019    Middle ear effusion    Vaginal discharge 05/15/2023    Vocal nodules in adults     Yeast infection 05/15/2023      Past Surgical History:   Procedure Laterality Date    OTHER SURGICAL HISTORY  12/03/2014    Alveoloplasty With Tooth Extraction 1-3 Teeth Per Quadrant    OTHER SURGICAL HISTORY  07/30/2020    Endoscopy      Menstrual History:  OB History    No obstetric history on file.       No LMP recorded.         Review of Systems    Objective   There were no vitals taken for this visit.    Exam:   Constitutional; alert with no acute distress.  Well-nourished.      Neck: No asymmetry noted.  Thyroid without enlargement.  No palpable nodules or masses of concern.    Cardiovascular: Heart regular rate and rhythm, normal S1 and S2    Pulmonary: No respiratory distress, lungs clear to auscultate bilaterally    Chest/breast exam: Appearance bilaterally normal, without asymmetry of concern, without skin lesions or nipple discharge.  Palpation of the breast-no palpable masses no lymphadenopathy of the axilla.    Abdomen: Soft, nontender, no abdominal masses palpated    Genitourinary:  Palpation of the lymph nodes of the groin-no inguinal lymphadenopathy  External genitalia/perianal: Without lesions normal in appearance   Urethra: In appearance without lesions Bladder: non-tender to palpate  Vagina: Without lesions including Bartholin, urethra, Waldo's glands within normal limits all WNL   Cervix: Normal in appearance without lesions, no cervical motion tenderness to palpation  Uterus: Without enlargement, mobile, nontender to  palpate  Bilateral adnexa:  without masses, nontender to palpate    Skin: Normal skin color and pigmentation    Psychiatric: Alert and oriented x3. Affect normal to patient baseline.  Mood: appropriate       Assessment/Plan   Diagnoses and all orders for this visit:  Encounter for gynecological examination with abnormal finding  Threatened  in early pregnancy  -     Human Chorionic Gonadotropin, Serum Quantitative; Future  -     CBC; Future  -     Type And Screen; Future  -     POCT pregnancy, urine manually resulted  -     POCT UA Automated manually resulted  -     C. Trachomatis / N. Gonorrhoeae, Amplified Detection  -     Urine culture  Abnormal uterine bleeding (AUB)      No follow-ups on file.     Pap plan: WNL HX, plan co-testing every 5 years   STI screening annually and prn if needed  Contraception discussed  Safe sex discussed   RTO 1 year annual exam, prn   Mammogram screening evaluation     Mesha Velasco, APRN-CNM, APRN-CNP   [place]   Marisol Rebolledo is a 33 y.o. female who presents for irregular menses. She had been bleeding that started after + HCG  Pt has a pelvic floor dysfunction that causes her to strain with bowel movement. Pt states she had been straining daily and had bright bleeding bleeding, that was spotting.   Single partner, .     Menses prior to + UPT monthly, desired pregnancy.     Menstrual History:  OB History    No obstetric history on file.          No LMP recorded.       Past Medical History:   Diagnosis Date    Acute maxillary sinusitis, unspecified 2016    Acute maxillary sinusitis    Acute upper respiratory infection, unspecified 10/28/2019    Viral URI with cough    Asthma     Breast tenderness in female 05/15/2023    Chest pain 05/15/2023    Dizziness 05/15/2023    Facial flushing 05/15/2023    Heavy periods 05/15/2023    Hordeolum externum left lower eyelid 06/10/2019    Hordeolum externum of left lower eyelid    Left ear pain 05/15/2023    Left  flank pain 05/15/2023    Mild intermittent asthma with exacerbation 05/15/2023    Onychomycosis of toenail 05/15/2023    Other conditions influencing health status     No significant past medical history    Other specified disorders of eustachian tube, left ear 03/14/2020    Dysfunction of left eustachian tube    Pectoralis muscle strain 05/15/2023    Pelvic and perineal pain 04/09/2019    Pelvic pain in female    Personal history of other diseases of the respiratory system     History of sore throat    Personal history of other specified conditions 04/09/2019    History of abdominal pain    Pes planus of left foot 05/15/2023    Pneumonia, unspecified organism 03/01/2016    Community acquired pneumonia    Shortness of breath 05/15/2023    Spasm of muscle 11/13/2019    Unspecified nonsuppurative otitis media, unspecified ear 01/14/2019    Middle ear effusion    Vaginal discharge 05/15/2023    Vocal nodules in adults     Yeast infection 05/15/2023      Past Surgical History:   Procedure Laterality Date    OTHER SURGICAL HISTORY  12/03/2014    Alveoloplasty With Tooth Extraction 1-3 Teeth Per Quadrant    OTHER SURGICAL HISTORY  07/30/2020    Endoscopy      Objective   There were no vitals taken for this visit.    Genitourinary:  Palpation of the lymph nodes of the groin-no inguinal lymphadenopathy  External genitalia/perianal: Without lesions normal in appearance   Urethra: In appearance without lesions Bladder: non-tender to palpate  Vagina: Without lesions including Bartholin, urethra, [place]'s glands within normal limits all WNL   Cervix: Normal in appearance without lesions, no cervical motion tenderness to palpation- small amount of blood at os, cervix closed on exam  Uterus: Without enlargement, mobile, nontender to palpate  Bilateral adnexa:  without masses, nontender to palpate    Skin: Normal skin color and pigmentation    Psychiatric: Alert and oriented x3. Affect normal to patient baseline.  Mood:  appropriate    Assessment/Plan   Diagnoses and all orders for this visit:  Encounter for gynecological examination with abnormal finding  Threatened  in early pregnancy  -     Human Chorionic Gonadotropin, Serum Quantitative; Future  -     CBC; Future  -     Type And Screen; Future  -     POCT pregnancy, urine manually resulted  -     POCT UA Automated manually resulted  -     C. Trachomatis / N. Gonorrhoeae, Amplified Detection  -     Urine culture  Abnormal uterine bleeding (AUB)      Problem List Items Addressed This Visit    None  Visit Diagnoses       Encounter for gynecological examination with abnormal finding    -  Primary    Threatened  in early pregnancy        Relevant Orders    Human Chorionic Gonadotropin, Serum Quantitative (Completed)    CBC (Completed)    Type And Screen (Completed)    POCT pregnancy, urine manually resulted (Completed)    POCT UA Automated manually resulted (Completed)    C. Trachomatis / N. Gonorrhoeae, Amplified Detection    Urine culture    Abnormal uterine bleeding (AUB)               No follow-ups on file.       MAB precautions reviewed in length  RTO 2 weeks NOB with intake and routine labs- pending evaluation today  Consider a dating/12-13 wga ultrasound with MFM  Ultrasound today for threatened ab  Mesha Velasco, APRN-CNM, APRN-CNP

## 2024-02-05 NOTE — TELEPHONE ENCOUNTER
Pt called OBGYN office stating she is about 8 weeks pregnant based on LMP. Pt has a hx of constipation due to pelvic floor dysfunction that causes her to strain often. Pt noticed light spotting/dark red streaks yesterday when using the restroom. Pt denies heavy bleeding/sharp, stabbing abdominal pain. Will talk to CG and call pt back.

## 2024-02-06 ENCOUNTER — TELEPHONE (OUTPATIENT)
Dept: OBSTETRICS AND GYNECOLOGY | Facility: CLINIC | Age: 34
End: 2024-02-06
Payer: COMMERCIAL

## 2024-02-06 DIAGNOSIS — O20.0 THREATENED ABORTION IN EARLY PREGNANCY (HHS-HCC): Primary | ICD-10-CM

## 2024-02-06 LAB
BACTERIA UR CULT: NORMAL
C TRACH RRNA SPEC QL NAA+PROBE: NEGATIVE
N GONORRHOEA DNA SPEC QL PROBE+SIG AMP: NEGATIVE

## 2024-02-06 NOTE — TELEPHONE ENCOUNTER
PELVIC FLOOR THERAPY RECOMMENDED ADDING A MAGNESIUM SUPPLEMENT CALLED [employer] TO HER WATER FOR A NATURAL STOOL SOFTENER SHE WANTED TO MAKE SURE THIS IS OK TO TAKE

## 2024-02-06 NOTE — PROGRESS NOTES
Discussed with the pt the findings.     2 weeks Follow up dating ultrasound and NEW OB with me to follow.    Portia

## 2024-02-12 ENCOUNTER — TELEMEDICINE (OUTPATIENT)
Dept: OBSTETRICS AND GYNECOLOGY | Facility: CLINIC | Age: 34
End: 2024-02-12
Payer: COMMERCIAL

## 2024-02-12 DIAGNOSIS — O20.0 THREATENED ABORTION IN EARLY PREGNANCY (HHS-HCC): Primary | ICD-10-CM

## 2024-02-12 DIAGNOSIS — N92.6 MISSED MENSES: ICD-10-CM

## 2024-02-12 PROCEDURE — 1036F TOBACCO NON-USER: CPT | Performed by: NURSE PRACTITIONER

## 2024-02-12 PROCEDURE — 99213 OFFICE O/P EST LOW 20 MIN: CPT | Performed by: NURSE PRACTITIONER

## 2024-02-12 RX ORDER — PYRIDOXINE HCL (VITAMIN B6) 25 MG
25 TABLET ORAL EVERY 8 HOURS
Qty: 90 TABLET | Refills: 1 | Status: SHIPPED | OUTPATIENT
Start: 2024-02-12 | End: 2024-05-12

## 2024-02-12 NOTE — PROGRESS NOTES
Wants to discuss:  Pt has questions about PNV with iron and already has issues with constipation.Does she need to continue folate? Until chewable gummy vitamins due to constipation   Pt wants to discuss caffeine use in pregnancy. - discussed 1-2 cups a day   Pt wants note for lifting restrictions for work. - discussed 20 lbs    Patient presents for a telehealth visit with cc of missed LMP.   Patient's last menstrual period was 2023 (exact date).   LMP 2023 (Exact Date)    Estimated GA (based on LMP): 9w 0d  Estimated FELICITAS (based on LMP): 2024  She completed a home pregnancy test that was +.   Since + UPT she denies vaginal bleeding or c/o unusual pain.  Planned  pregnancy. Coping well. FOB: Shamar    Latex allergy? Yes   Blood transfusion acceptable? Yes    28 day cycle? Yes  BC at conception No  hCG+ at home (date) 2024    She is seeking to establish with  [facility].   Currently she is on PNV medications.   Current Outpatient Medications on File Prior to Visit   Medication Sig Dispense Refill    albuterol 90 mcg/actuation inhaler Inhale 2 puffs every 4 hours if needed.      folic acid (Folvite) 1 mg tablet Take 1 tablet (1 mg) by mouth once daily.      PRENATAL 2-IRON-FOLIC ACID-OM3 ORAL Take 1 tablet by mouth once daily.      Qvar RediHaler 80 mcg/actuation inhaler Inhale 2 Inhalations once daily.       No current facility-administered medications on file prior to visit.      She   Denies smoking,   Denies drugs,   Denies alcohol consumption.     OB History    Para Term  AB Living   1             SAB IAB Ectopic Multiple Live Births                  # Outcome Date GA Lbr Bernardo/2nd Weight Sex Delivery Anes PTL Lv   1 Current              Past Medical History:   Diagnosis Date    Acute maxillary sinusitis, unspecified 2016    Acute maxillary sinusitis    Acute upper respiratory infection, unspecified 10/28/2019    Viral URI with cough    Asthma     Breast tenderness  in female 05/15/2023    Chest pain 05/15/2023    Dizziness 05/15/2023    Facial flushing 05/15/2023    Heavy periods 05/15/2023    Hordeolum externum left lower eyelid 06/10/2019    Hordeolum externum of left lower eyelid    Left ear pain 05/15/2023    Left flank pain 05/15/2023    Mild intermittent asthma with exacerbation 05/15/2023    Onychomycosis of toenail 05/15/2023    Other conditions influencing health status     No significant past medical history    Other specified disorders of eustachian tube, left ear 03/14/2020    Dysfunction of left eustachian tube    Pectoralis muscle strain 05/15/2023    Pelvic and perineal pain 04/09/2019    Pelvic pain in female    Personal history of other diseases of the respiratory system     History of sore throat    Personal history of other specified conditions 04/09/2019    History of abdominal pain    Pes planus of left foot 05/15/2023    Pneumonia, unspecified organism 03/01/2016    Community acquired pneumonia    Shortness of breath 05/15/2023    Spasm of muscle 11/13/2019    Unspecified nonsuppurative otitis media, unspecified ear 01/14/2019    Middle ear effusion    Vaginal discharge 05/15/2023    Vocal nodules in adults     Yeast infection 05/15/2023      Past Surgical History:   Procedure Laterality Date    OTHER SURGICAL HISTORY  12/03/2014    Alveoloplasty With Tooth Extraction 1-3 Teeth Per Quadrant    OTHER SURGICAL HISTORY  07/30/2020    Endoscopy       Family History   Problem Relation Name Age of Onset    Hyperlipidemia Mother      Hypertension Mother      Diabetes Father      Retinoblastoma Mother's Sister      Skin cancer Mother's Sister      Other (prediabetes) Maternal Grandmother      Brain Aneurysm Maternal Grandfather      Other (heart valve issues) Maternal Grandfather      Other (fatty liver) Maternal Grandfather      Diabetes Paternal Grandmother      Heart attack Paternal Grandmother      Spina bifida Cousin      Cancer Cousin      Breast cancer  Maternal Great-Grandmother          Family Hx:  No family hx concerns of thrombophilias.   Denies family hx of births with complications, denies chromosomal or structural abnormalities in family member's births. (Removed cousin with spina bifida).     She denies complications during the course of her previous pregnancies.      Personal medical and surgical history reviewed:  She denies personal hx of STIs including: HIV, RPR, HEP B, HEP C, and HERPES   No personal hx of DM or HTN.   No personal hx of GYN surgeries.       Previous genetic testing:   CF/ SMA/ Thalassemias     ROS:  + nausea  +breast tenderness  +missed LMP      Plan:  Education about care [place]: Delivery planned for Candida, Nutrition in weight gain counseling, Toxoplasmosis precautions, Environmental/Work Hazzards, Risk factors , and Maintaining midwifery care   ASA criteria   Based on intake patient meets criteria to maintain midwifery care.   Discussed dating ultrasound, pt accepts will schedule accordingly with RTO in to follow for NEW OB initial exam and OBV.  Discussed unisom and b6 for N&V pt to call if this is not assisting to discuss further medication management.   Discussed mychart access and calling for emergencies if needed.      Marisol was seen today for amenorrhea.  Diagnoses and all orders for this visit:  Missed menses  -     prenatal vitamin, iron-folic, 27 mg iron-800 mcg folic acid tablet; Take 1 tablet by mouth once daily.  -     pyridoxine (Vitamin B-6) 25 mg tablet; Take 1 tablet (25 mg) by mouth every 8 hours.  -     doxylamine (Unisom, doxylamine,) 25 mg tablet; Take 1 tablet (25 mg) by mouth as needed at bedtime for sleep.  -     US OB < 14 weeks early; Future      Mesha Velasco, APRN-CNM, APRN-CNP

## 2024-02-19 ENCOUNTER — TELEPHONE (OUTPATIENT)
Dept: OBSTETRICS AND GYNECOLOGY | Facility: CLINIC | Age: 34
End: 2024-02-19
Payer: COMMERCIAL

## 2024-02-19 NOTE — TELEPHONE ENCOUNTER
Pt called OBGYN office asking if she can take Miralax for constipation as it's the only thing that helps. Pt states she's been taking colace for the last 1.5 weeks with no relief. She also notes that she noticed the bleeding when she strains from constipation. Pt advised per CG that she can take Miralax but to use it as minimally as needed. Pt states she usually takes 1 full cap/day and that gives her relief. Pt agreeable with plan of care with no further questions.

## 2024-02-20 ENCOUNTER — HOSPITAL ENCOUNTER (OUTPATIENT)
Dept: RADIOLOGY | Facility: CLINIC | Age: 34
Discharge: HOME | End: 2024-02-20
Payer: COMMERCIAL

## 2024-02-20 DIAGNOSIS — Z3A.13 13 WEEKS GESTATION OF PREGNANCY (HHS-HCC): ICD-10-CM

## 2024-02-20 DIAGNOSIS — O20.0 THREATENED ABORTION IN EARLY PREGNANCY (HHS-HCC): ICD-10-CM

## 2024-02-20 PROCEDURE — 76801 OB US < 14 WKS SINGLE FETUS: CPT | Performed by: OBSTETRICS & GYNECOLOGY

## 2024-02-20 PROCEDURE — 76801 OB US < 14 WKS SINGLE FETUS: CPT

## 2024-02-26 ENCOUNTER — INITIAL PRENATAL (OUTPATIENT)
Dept: OBSTETRICS AND GYNECOLOGY | Facility: CLINIC | Age: 34
End: 2024-02-26
Payer: COMMERCIAL

## 2024-02-26 VITALS — SYSTOLIC BLOOD PRESSURE: 120 MMHG | WEIGHT: 207 LBS | DIASTOLIC BLOOD PRESSURE: 84 MMHG | BODY MASS INDEX: 29.7 KG/M2

## 2024-02-26 DIAGNOSIS — Z34.01 PRIMIGRAVIDA, FIRST TRIMESTER (HHS-HCC): Primary | ICD-10-CM

## 2024-02-26 DIAGNOSIS — Z3A.11 11 WEEKS GESTATION OF PREGNANCY (HHS-HCC): ICD-10-CM

## 2024-02-26 DIAGNOSIS — Z12.4 CERVICAL CANCER SCREENING: ICD-10-CM

## 2024-02-26 DIAGNOSIS — Z34.01 ENCOUNTER FOR SUPERVISION OF NORMAL FIRST PREGNANCY IN FIRST TRIMESTER (HHS-HCC): ICD-10-CM

## 2024-02-26 DIAGNOSIS — Z34.91 PRENATAL CARE IN FIRST TRIMESTER (HHS-HCC): ICD-10-CM

## 2024-02-26 PROCEDURE — 86317 IMMUNOASSAY INFECTIOUS AGENT: CPT

## 2024-02-26 PROCEDURE — 88175 CYTOPATH C/V AUTO FLUID REDO: CPT

## 2024-02-26 PROCEDURE — 87340 HEPATITIS B SURFACE AG IA: CPT

## 2024-02-26 PROCEDURE — 86787 VARICELLA-ZOSTER ANTIBODY: CPT

## 2024-02-26 PROCEDURE — 86803 HEPATITIS C AB TEST: CPT

## 2024-02-26 PROCEDURE — 86780 TREPONEMA PALLIDUM: CPT

## 2024-02-26 PROCEDURE — 87624 HPV HI-RISK TYP POOLED RSLT: CPT

## 2024-02-26 PROCEDURE — 87389 HIV-1 AG W/HIV-1&-2 AB AG IA: CPT

## 2024-02-26 PROCEDURE — 0502F SUBSEQUENT PRENATAL CARE: CPT | Performed by: NURSE PRACTITIONER

## 2024-02-26 PROCEDURE — 36415 COLL VENOUS BLD VENIPUNCTURE: CPT

## 2024-02-26 NOTE — PROGRESS NOTES
Subjective   Patient ID [id number]   Marisol Rebolledo is a 33 y.o.  at Unknown with a working estimated date of delivery of Not found. who presents for an initial prenatal visit. This pregnancy is planned.    Her pregnancy is complicated by:  Pelvic Floor concerns  Asthma- QVAR but not taking it due to cost. Albuterol as needed   FM hx of congenital heart defect-  Murmur at birth that resolved on its own- no surgery   -->anatomy ultrasound   -->fetal echo??    Genetic Screening Genetic Screening/Teratology Counseling- Includes patient, baby's father, or anyone in either family with:      Positive       Neural tube defect (Meningomyelocele, Spina bifida, or Anencephaly)   Yes maternal 2nd cousin-       Congenital heart defect   Yes FOB-hole in heart                 Negative       Patient's age 35 years or older as of estimated date of delivery   No        Thalassemia (Italian, Greek, Mediterranean, or  background): MCV less than 80   No        Down syndrome   No        Srinivasan-Sachs (Ashkenazi Christianity, Cajun, French [place])   No        Canavan disease (Ashkenazi Christianity)   No        Familial dysautonomia (Ashkenazi Christianity)   No        Sickle cell disease or trait ()   No        Hemophilia or other blood disorders   No        Muscular dystrophy   No        Cystic fibrosis   No        Mady's chorea   No        Intellectual disability and/or autism   No        Other inherited genetic or chromosomal disorder   No        Maternal metabolic disorder (eg. Type 1 diabetes, PKU)   No        Patient or baby's father had child with birth defects not listed above   No        Recurrent pregnancy loss, or a stillbirth   No        Medications (including supplements, vitamins, herbs, or OTC drugs)/illicit/recreational drugs/alcohol since last menstrual period   No                         OB History    Para Term  AB Living   1             SAB IAB Ectopic Multiple Live Births                  #  Outcome Date GA Lbr Bernardo/2nd Weight Sex Delivery Anes PTL Lv   1 Current                   Objective   Physical Exam  Weight: 93.9 kg (207 lb)  Expected Total Weight Gain: Could not be calculated   Pregravid BMI: Could not be calculated  BP: 120/84    Fetal Heart Rate: 165     OBGyn Exam  See initial exam  Prenatal Labs      Assessment/Plan   Diagnoses and all orders for this visit:  Primigravida, first trimester  -     THINPREP PAP  Cervical cancer screening  -     THINPREP PAP  11 weeks gestation of pregnancy        Immunizations: pending  Prenatal Labs ordered  Daily prenatal vitamins prescribed  First trimester screening and second trimester screening discussed. Patient decided to proceed with cfDNA.   Follow up in 4 weeks for return OB visit.    Mesha Velasco, APRN-CHASEM, APRN-CNP

## 2024-02-27 LAB
HBV SURFACE AG SERPL QL IA: NONREACTIVE
HCV AB SER QL: NONREACTIVE
HIV 1+2 AB+HIV1 P24 AG SERPL QL IA: NONREACTIVE
RUBV IGG SERPL IA-ACNC: 1.8 IA
RUBV IGG SERPL QL IA: POSITIVE
TREPONEMA PALLIDUM IGG+IGM AB [PRESENCE] IN SERUM OR PLASMA BY IMMUNOASSAY: NONREACTIVE
VARICELLA ZOSTER IGG INDEX: 0.2 IA
VZV IGG SER QL IA: NEGATIVE

## 2024-02-28 DIAGNOSIS — J45.20 MILD INTERMITTENT ASTHMA WITHOUT COMPLICATION (HHS-HCC): Primary | ICD-10-CM

## 2024-02-28 RX ORDER — FLUTICASONE PROPIONATE 50 MCG
1 SPRAY, SUSPENSION (ML) NASAL DAILY
Qty: 16 G | Refills: 3 | Status: SHIPPED | OUTPATIENT
Start: 2024-02-28 | End: 2024-04-09 | Stop reason: ALTCHOICE

## 2024-02-28 RX ORDER — ALBUTEROL SULFATE 90 UG/1
2 AEROSOL, METERED RESPIRATORY (INHALATION) EVERY 4 HOURS PRN
Qty: 18 G | Refills: 1 | Status: SHIPPED | OUTPATIENT
Start: 2024-02-28

## 2024-02-28 RX ORDER — BUDESONIDE AND FORMOTEROL FUMARATE DIHYDRATE 160; 4.5 UG/1; UG/1
1 AEROSOL RESPIRATORY (INHALATION)
Qty: 10.2 G | Refills: 11 | Status: SHIPPED | OUTPATIENT
Start: 2024-02-28 | End: 2024-04-09 | Stop reason: SDUPTHER

## 2024-02-28 NOTE — PROGRESS NOTES
Patient called in for asthma exacerbation.   Her SpO2-94-98%. She has increased effort. Officially I did discuss going to the ED, but she does not have insurance and does not want to go in.     We did discuss exacerbation in pregnancy and respiratory decompensation. We discussed that decompensation can be rapid. I am starting Symbicort 160-4.5 mcg inhaler. Albuterol inhaler ordered as well. Recommended Flonase.    She does have a productive cough currently. We discussed symptoms of pneumonia in detail. I gave her ER and breathing precautions. We discussed that night-time is often associated with worsening symptoms as well. Patient is willing to try Symbicort and albuterol and go to the ED if worsening.

## 2024-02-29 PROBLEM — O09.899 MATERNAL VARICELLA, NON-IMMUNE (HHS-HCC): Status: ACTIVE | Noted: 2024-02-29

## 2024-02-29 PROBLEM — Z28.39 MATERNAL VARICELLA, NON-IMMUNE (HHS-HCC): Status: ACTIVE | Noted: 2024-02-29

## 2024-03-01 ENCOUNTER — ROUTINE PRENATAL (OUTPATIENT)
Dept: OBSTETRICS AND GYNECOLOGY | Facility: CLINIC | Age: 34
End: 2024-03-01
Payer: COMMERCIAL

## 2024-03-01 VITALS — BODY MASS INDEX: 29.7 KG/M2 | DIASTOLIC BLOOD PRESSURE: 78 MMHG | WEIGHT: 207 LBS | SYSTOLIC BLOOD PRESSURE: 128 MMHG

## 2024-03-01 DIAGNOSIS — Z3A.11 11 WEEKS GESTATION OF PREGNANCY (HHS-HCC): ICD-10-CM

## 2024-03-01 DIAGNOSIS — O09.899 MATERNAL VARICELLA, NON-IMMUNE (HHS-HCC): ICD-10-CM

## 2024-03-01 DIAGNOSIS — Z34.01 PRIMIGRAVIDA, FIRST TRIMESTER (HHS-HCC): ICD-10-CM

## 2024-03-01 DIAGNOSIS — J06.9 VIRAL UPPER RESPIRATORY INFECTION: Primary | ICD-10-CM

## 2024-03-01 DIAGNOSIS — Z87.09 HISTORY OF ASTHMA: ICD-10-CM

## 2024-03-01 DIAGNOSIS — Z28.39 MATERNAL VARICELLA, NON-IMMUNE (HHS-HCC): ICD-10-CM

## 2024-03-01 PROCEDURE — 0501F PRENATAL FLOW SHEET: CPT | Performed by: MIDWIFE

## 2024-03-01 RX ORDER — NEBULIZER AND COMPRESSOR
1 EACH MISCELLANEOUS EVERY 6 HOURS PRN
Qty: 1 EACH | Refills: 0 | Status: SHIPPED | OUTPATIENT
Start: 2024-03-01

## 2024-03-01 RX ORDER — ALBUTEROL SULFATE 0.63 MG/3ML
0.63 SOLUTION RESPIRATORY (INHALATION) EVERY 6 HOURS PRN
Qty: 75 ML | Refills: 11 | Status: SHIPPED | OUTPATIENT
Start: 2024-03-01 | End: 2025-03-01

## 2024-03-01 NOTE — RESULT ENCOUNTER NOTE
Please notify the patient that she is non-immune to chicken pox and we can discuss this next visit about vaccine PP.   Avoid contact with people of concern with possible infections including Shingles.

## 2024-03-01 NOTE — PROGRESS NOTES
"Subjective   Patient ID [id number]   Marisol Rebolledo is a 33 y.o.  at 11w4d with a working estimated date of delivery of 2024, by Last Menstrual Period who presents for a problem related visit. She was removing old plaster last weekend and began having shortness of breath and productive cough. She started an inhaler this week and notes some improvement since, but is concerned that she is still coughing up green mucous in the mornings that persists and changes to a clearish yellow as the day progresses. She denies any fevers, sinus, throat, or ear pain, or other complaints. Prior to this, her asthma is typically well controlled and she denies h/o admissions or intubations. She was encouraged to be evaluated by ED earlier this week and today but declines and requested a lung assessment today.     We discussed comfort measures, concerning s/s that would indicate ED management, and typical viral illness progression in pregnancy. She declines ED management and notes she is monitoring pulse oximetry at home and it has been better and typically >95%.     Her pregnancy is complicated by:  -acute asthma exacerbation, using Symbicort daily and PRN albuterol    Objective   Physical Exam: NAD, CN grossly intact, no edema; alert & oriented @ baseline   Lung assessment:    Anterior: expiratory wheezes in RISHI that clear with coughing, clear sounds RIGHT    Posterior: CTAB after coughing   Weight: 93.9 kg (207 lb), Pregravid BMI: Could not be calculated  Expected Total Weight Gain: Could not be calculated   BP: 128/78         Prenatal Labs  Urine dip:  Lab Results   Component Value Date    KETONESU TRACE (A) 2024     Lab Results   Component Value Date    HGB 12.2 2024    HCT 37.6 2024    ABO A 2024    HEPBSAG Nonreactive 2024     No results found for: \"PAPPA\", \"AFP\", \"HCG\", \"ESTRIOL\", \"INHBA\"    Imaging    Assessment/Plan   Diagnoses and all orders for this visit:  Viral upper respiratory " infection  Maternal varicella, non-immune  Primigravida, first trimester  11 weeks gestation of pregnancy  History of asthma  Continue prenatal vitamin and inhalers.  Labs reviewed.  Order placed for anatomy scan at 20 weeks.  Follow up in 4 weeks for a routine prenatal visit.

## 2024-03-04 ENCOUNTER — TELEPHONE (OUTPATIENT)
Dept: OBSTETRICS AND GYNECOLOGY | Facility: CLINIC | Age: 34
End: 2024-03-04
Payer: COMMERCIAL

## 2024-03-07 LAB
CYTOLOGY CMNT CVX/VAG CYTO-IMP: NORMAL
HPV HR 12 DNA GENITAL QL NAA+PROBE: NEGATIVE
HPV HR GENOTYPES PNL CVX NAA+PROBE: NEGATIVE
HPV16 DNA SPEC QL NAA+PROBE: NEGATIVE
HPV18 DNA SPEC QL NAA+PROBE: NEGATIVE
LAB AP HPV GENOTYPE QUESTION: YES
LAB AP HPV HR: NORMAL
LABORATORY COMMENT REPORT: NORMAL
PATH REPORT.TOTAL CANCER: NORMAL

## 2024-03-12 ENCOUNTER — APPOINTMENT (OUTPATIENT)
Dept: RADIOLOGY | Facility: CLINIC | Age: 34
End: 2024-03-12
Payer: COMMERCIAL

## 2024-03-22 ENCOUNTER — ROUTINE PRENATAL (OUTPATIENT)
Dept: OBSTETRICS AND GYNECOLOGY | Facility: CLINIC | Age: 34
End: 2024-03-22
Payer: COMMERCIAL

## 2024-03-22 VITALS — BODY MASS INDEX: 29.27 KG/M2 | SYSTOLIC BLOOD PRESSURE: 129 MMHG | WEIGHT: 204 LBS | DIASTOLIC BLOOD PRESSURE: 75 MMHG

## 2024-03-22 DIAGNOSIS — Z34.02 PRIMIGRAVIDA, SECOND TRIMESTER (HHS-HCC): ICD-10-CM

## 2024-03-22 DIAGNOSIS — Z28.39 MATERNAL VARICELLA, NON-IMMUNE (HHS-HCC): Primary | ICD-10-CM

## 2024-03-22 DIAGNOSIS — Z87.09 HISTORY OF ASTHMA: ICD-10-CM

## 2024-03-22 DIAGNOSIS — O09.899 MATERNAL VARICELLA, NON-IMMUNE (HHS-HCC): Primary | ICD-10-CM

## 2024-03-22 PROCEDURE — 0501F PRENATAL FLOW SHEET: CPT | Performed by: MIDWIFE

## 2024-03-22 NOTE — PROGRESS NOTES
"Subjective   Patient ID [id number]   Marisol Rebolledo is a 33 y.o.  at 14w4d with a working estimated date of delivery of 2024, by Last Menstrual Period who presents for a routine prenatal visit. She is feeling well and has no physical complaints. She denies n/v, urinary complaints, or vaginal bleeding. She had some general questions that were answered to her satisfaction.     Her pregnancy is complicated by:  -asthma with an acute exacerbation during first trimester     Since last visit her respiratory symptoms have improved. She is using Symbicort as directed, but notes she is still using her rescue inhaler twice daily. It sounds like she is using it as prophylaxis and not based on symptoms; we discussed the associated risk of inhaler over-use and making sure she is only using it for present symptoms of wheezing/shortness of breath. She verbalized understanding. We also discussed common pregnancy complaints, interventions, procuring medical equipment via [employer], and anticipated upcoming prenatal care.     Objective   Physical Exam: NAD, easy respiratory effort, CN grossly intact, no edema; alert & oriented @ baseline.   Weight: 92.5 kg (204 lb)  Expected Total Weight Gain: Could not be calculated   Pregravid BMI: Could not be calculated  BP: 129/75         Prenatal Labs  Urine dip:  Lab Results   Component Value Date    KETONESU TRACE (A) 2024       Lab Results   Component Value Date    HGB 12.2 2024    HCT 37.6 2024    ABO A 2024    HEPBSAG Nonreactive 2024     No results found for: \"PAPPA\", \"AFP\", \"HCG\", \"ESTRIOL\", \"INHBA\"  No results found for: \"GLUF\", \"GLUT1\", \"UYYFUOJ9SR\", \"[id number]\"    Imaging  See reports.     Assessment/Plan   Diagnoses and all orders for this visit:  Maternal varicella, non-immune  History of asthma  Primigravida, second trimester  Continue prenatal vitamin.  Labs reviewed and up to date for GA.   Follow up in 4 weeks for a routine prenatal " visit.    JIMBO ESPINOZA

## 2024-03-22 NOTE — LETTER
April 2, 2024     Marisol Rebolledo  [address]  [place] [address]    Patient: Marisol Rebolledo   YOB: 1990   Date of Visit: 3/22/2024       To whom it may concern,    This letter is to notify you that Marisol Rebolledo can continue her pelvic floor physical therapy. Pelvic floor therapy is safe in pregnancy. Please reach out to us if you have any other questions/concerns. [phone]. Fax: [phone].     Sincerely,    Mesha Rubio RN    CC: No Recipients  ______________________________________________________________________________________

## 2024-04-08 ENCOUNTER — ROUTINE PRENATAL (OUTPATIENT)
Dept: OBSTETRICS AND GYNECOLOGY | Facility: CLINIC | Age: 34
End: 2024-04-08
Payer: COMMERCIAL

## 2024-04-08 ENCOUNTER — APPOINTMENT (OUTPATIENT)
Dept: OBSTETRICS AND GYNECOLOGY | Facility: CLINIC | Age: 34
End: 2024-04-08
Payer: COMMERCIAL

## 2024-04-08 VITALS — BODY MASS INDEX: 29.13 KG/M2 | DIASTOLIC BLOOD PRESSURE: 80 MMHG | SYSTOLIC BLOOD PRESSURE: 118 MMHG | WEIGHT: 203 LBS

## 2024-04-08 DIAGNOSIS — Z28.39 MATERNAL VARICELLA, NON-IMMUNE (HHS-HCC): ICD-10-CM

## 2024-04-08 DIAGNOSIS — Z3A.17 17 WEEKS GESTATION OF PREGNANCY (HHS-HCC): ICD-10-CM

## 2024-04-08 DIAGNOSIS — M62.89 PELVIC FLOOR DYSFUNCTION: ICD-10-CM

## 2024-04-08 DIAGNOSIS — J45.30 MILD PERSISTENT ASTHMA, UNSPECIFIED WHETHER COMPLICATED (HHS-HCC): ICD-10-CM

## 2024-04-08 DIAGNOSIS — R33.9 URINARY RETENTION: ICD-10-CM

## 2024-04-08 DIAGNOSIS — O09.899 MATERNAL VARICELLA, NON-IMMUNE (HHS-HCC): ICD-10-CM

## 2024-04-08 DIAGNOSIS — Z34.02 PRIMIGRAVIDA, SECOND TRIMESTER (HHS-HCC): Primary | ICD-10-CM

## 2024-04-08 LAB
POC APPEARANCE, URINE: ABNORMAL
POC BILIRUBIN, URINE: ABNORMAL
POC BLOOD, URINE: NEGATIVE
POC COLOR, URINE: YELLOW
POC GLUCOSE, URINE: NEGATIVE MG/DL
POC KETONES, URINE: NEGATIVE MG/DL
POC LEUKOCYTES, URINE: NEGATIVE
POC NITRITE,URINE: NEGATIVE
POC PH, URINE: 6 PH
POC PROTEIN, URINE: NEGATIVE MG/DL
POC SPECIFIC GRAVITY, URINE: 1.02
POC UROBILINOGEN, URINE: 0.2 EU/DL

## 2024-04-08 PROCEDURE — 87086 URINE CULTURE/COLONY COUNT: CPT

## 2024-04-08 PROCEDURE — 0501F PRENATAL FLOW SHEET: CPT | Performed by: NURSE PRACTITIONER

## 2024-04-08 NOTE — PROGRESS NOTES
"Subjective   Patient ID [id number]   Marisol Rebolledo is a 33 y.o.  at 17w0d with a working estimated date of delivery of 2024, by Last Menstrual Period who presents for a routine prenatal visit. She is feeling well and has no physical complaints. She denies n/v, urinary complaints, or vaginal bleeding. She had some general questions that were answered to her satisfaction. No fetal movement yet today.     Currently off albuterol, and still using Symbicort twice daily. Seeing PCP tomorrow for ongoing management.   Pt desiring to maintain PT for pelvic floor.     But new onset of unable to empty her bladder. Pt states she has urinary retention. Drinking 32 oz of water and some pop/tea during. Concerns for dehydration. Ongoing pelvic floor concerns.     Her pregnancy is complicated by:  -asthma with an acute exacerbation during first trimester       Objective   Physical Exam: NAD, easy respiratory effort, CN grossly intact, no edema; alert & oriented @ baseline.   Weight: 92.1 kg (203 lb)  Expected Total Weight Gain: Could not be calculated   Pregravid BMI: Could not be calculated  BP: 118/80  Fetal Heart Rate: 143 Fundal Height (cm): 18 cm    Prenatal Labs  Urine dip:  Lab Results   Component Value Date    KETONESU NEGATIVE 2024       Lab Results   Component Value Date    HGB 12.2 2024    HCT 37.6 2024    ABO A 2024    HEPBSAG Nonreactive 2024     No results found for: \"PAPPA\", \"AFP\", \"HCG\", \"ESTRIOL\", \"INHBA\"  No results found for: \"GLUF\", \"GLUT1\", \"OEVCFNW6HF\", \"JPWVVVL6NS\"    Imaging  See reports.     Marisol was seen today for routine prenatal visit.  Diagnoses and all orders for this visit:  Primigravida, second trimester (Primary)  17 weeks gestation of pregnancy  Maternal varicella, non-immune  Mild persistent asthma, unspecified whether complicated  Pelvic floor dysfunction  Urinary retention  -     Urine culture  -     POCT UA (nonautomated) manually resulted     Continue " prenatal vitamin.  Labs reviewed and up to date for GA.   Follow up in 4 weeks for a routine prenatal visit.    Asthma:  Referred to PCP tomorrow  Continued Symbicort for now    Urinary retention:  Pt states she is unable to empty her bladder with voiding.   Send urine culture today.   Consider URO-GYN referral.   Continue PT    Discussed diet and calorie intake in pregnancy.   Recommended 60 grams of protein a day and 60-90 oz of water a day.   Anatomy ultrasound scheduled.     Mesha Velasco, APRN-CNM, APRN-CNP

## 2024-04-09 ENCOUNTER — OFFICE VISIT (OUTPATIENT)
Dept: PRIMARY CARE | Facility: CLINIC | Age: 34
End: 2024-04-09
Payer: COMMERCIAL

## 2024-04-09 VITALS
WEIGHT: 203 LBS | HEART RATE: 85 BPM | BODY MASS INDEX: 29.06 KG/M2 | OXYGEN SATURATION: 99 % | SYSTOLIC BLOOD PRESSURE: 122 MMHG | HEIGHT: 70 IN | DIASTOLIC BLOOD PRESSURE: 64 MMHG

## 2024-04-09 DIAGNOSIS — J45.20 MILD INTERMITTENT ASTHMA WITHOUT COMPLICATION (HHS-HCC): ICD-10-CM

## 2024-04-09 DIAGNOSIS — J45.30 MILD PERSISTENT ASTHMA WITHOUT COMPLICATION (HHS-HCC): Primary | ICD-10-CM

## 2024-04-09 PROCEDURE — 99213 OFFICE O/P EST LOW 20 MIN: CPT | Performed by: FAMILY MEDICINE

## 2024-04-09 RX ORDER — BUDESONIDE AND FORMOTEROL FUMARATE DIHYDRATE 160; 4.5 UG/1; UG/1
1 AEROSOL RESPIRATORY (INHALATION)
Qty: 10.2 G | Refills: 11 | Status: SHIPPED | OUTPATIENT
Start: 2024-04-09 | End: 2025-04-09

## 2024-04-09 NOTE — PROGRESS NOTES
Subjective   Patient ID: Marisol Rebolledo is a 33 y.o. female who presents for Asthma (FOLLOW UP ON HER ASTHMA SINCE SHE'S PREGNANT ).  Asthma  Her past medical history is significant for asthma.      Is currently 17.5 weeks pregnant and is followed by OBGYN and a midwife team.    Her symptoms of asthma are well controlled via Symbicort inhaler 2x/day. Asthma symptoms are triggered by inhaled irritants such as pollen, plant spores, cigarette smoke, campfires, cold/flu. Denies current symptoms of shortness of breath, wheezing, cough, hoarseness. Last episode of asthma was 2 months ago associated with a cold/flu, which she managed with albuterol every 4 hours.     Does not currently use albuterol because she is asymptomatic and is pregnant.     Previously used Qvar inhaler but switched because of cost and intermittent candidal infections. She reports being allergic to antifungal medications as well.     Pregnancy Symptoms  - Headache: Reports rare headache in frontal region, episodic (Only twice during pregnancy). Some minor photophobia but resolves spontaneously.   - Pelvic Floor Dysfunction / Incomplete Emptying of Bladder: For 2 weeks, has completed urinalysis and urine culture, awaiting results.     Current Outpatient Medications:     albuterol 0.63 mg/3 mL nebulizer solution, Take 3 mL (0.63 mg) by nebulization every 6 hours if needed for wheezing., Disp: 75 mL, Rfl: 11    albuterol 90 mcg/actuation inhaler, Inhale 2 puffs every 4 hours if needed for wheezing., Disp: 18 g, Rfl: 1    nebulizer and compressor device, 1 Device every 6 hours if needed (as needed for cough/wheezing)., Disp: 1 each, Rfl: 0    PRENATAL 2-IRON-FOLIC ACID-OM3 ORAL, Take 1 tablet by mouth once daily., Disp: , Rfl:     prenatal vitamin, iron-folic, 27 mg iron-800 mcg folic acid tablet, Take 1 tablet by mouth once daily., Disp: 30 tablet, Rfl: 0    pyridoxine (Vitamin B-6) 25 mg tablet, Take 1 tablet (25 mg) by mouth every 8 hours., Disp: 90  tablet, Rfl: 1    budesonide-formoteroL (Symbicort) 160-4.5 mcg/actuation inhaler, Inhale 1 puff 2 times a day. Rinse mouth with water after use to reduce aftertaste and incidence of candidiasis. Do not swallow., Disp: 10.2 g, Rfl: 11    folic acid (Folvite) 1 mg tablet, Take 1 tablet (1 mg) by mouth once daily., Disp: , Rfl:    Past Surgical History:   Procedure Laterality Date    OTHER SURGICAL HISTORY  12/03/2014    Alveoloplasty With Tooth Extraction 1-3 Teeth Per Quadrant    OTHER SURGICAL HISTORY  07/30/2020    Endoscopy      Past Medical History:   Diagnosis Date    Acute maxillary sinusitis, unspecified 06/09/2016    Acute maxillary sinusitis    Acute upper respiratory infection, unspecified 10/28/2019    Viral URI with cough    Asthma     Breast tenderness in female 05/15/2023    Chest pain 05/15/2023    Dizziness 05/15/2023    Facial flushing 05/15/2023    Heavy periods 05/15/2023    Hordeolum externum left lower eyelid 06/10/2019    Hordeolum externum of left lower eyelid    Left ear pain 05/15/2023    Left flank pain 05/15/2023    Mild intermittent asthma with exacerbation 05/15/2023    Onychomycosis of toenail 05/15/2023    Other conditions influencing health status     No significant past medical history    Other specified disorders of eustachian tube, left ear 03/14/2020    Dysfunction of left eustachian tube    Pectoralis muscle strain 05/15/2023    Pelvic and perineal pain 04/09/2019    Pelvic pain in female    Personal history of other diseases of the respiratory system     History of sore throat    Personal history of other specified conditions 04/09/2019    History of abdominal pain    Pes planus of left foot 05/15/2023    Pneumonia, unspecified organism 03/01/2016    Community acquired pneumonia    Shortness of breath 05/15/2023    Spasm of muscle 11/13/2019    Unspecified nonsuppurative otitis media, unspecified ear 01/14/2019    Middle ear effusion    Vaginal discharge 05/15/2023    Vocal  "nodules in adults     Yeast infection 05/15/2023     Social History     Tobacco Use    Smoking status: Never    Smokeless tobacco: Never   Vaping Use    Vaping status: Never Used   Substance Use Topics    Alcohol use: Not Currently     Alcohol/week: 1.0 standard drink of alcohol     Types: 1 Glasses of wine per week    Drug use: Never      Family History   Problem Relation Name Age of Onset    Hyperlipidemia Mother      Hypertension Mother      Diabetes Father      Retinoblastoma Mother's Sister      Skin cancer Mother's Sister      Other (prediabetes) Maternal Grandmother      Brain Aneurysm Maternal Grandfather      Other (heart valve issues) Maternal Grandfather      Other (fatty liver) Maternal Grandfather      Diabetes Paternal Grandmother      Heart attack Paternal Grandmother      Spina bifida Cousin      Cancer Cousin      Breast cancer Maternal Great-Grandmother        Review of Systems    Objective   /64   Pulse 85   Ht 1.778 m (5' 10\")   Wt 92.1 kg (203 lb)   LMP 12/11/2023 (Exact Date)   SpO2 99%   BMI 29.13 kg/m²    Physical Exam  Vitals and nursing note reviewed.   Constitutional:       General: She is not in acute distress.     Appearance: Normal appearance. She is not ill-appearing.   HENT:      Head: Normocephalic and atraumatic.   Eyes:      Extraocular Movements: Extraocular movements intact.      Conjunctiva/sclera: Conjunctivae normal.      Pupils: Pupils are equal, round, and reactive to light.   Cardiovascular:      Rate and Rhythm: Normal rate and regular rhythm.      Pulses: Normal pulses.      Heart sounds: Normal heart sounds. No murmur heard.  Pulmonary:      Effort: Pulmonary effort is normal.      Breath sounds: Normal breath sounds. No wheezing, rhonchi or rales.   Abdominal:      General: Abdomen is flat. Bowel sounds are normal.      Palpations: Abdomen is soft.   Skin:     Capillary Refill: Capillary refill takes less than 2 seconds.   Neurological:      General: No " focal deficit present.      Mental Status: She is alert and oriented to person, place, and time.   Psychiatric:         Mood and Affect: Mood normal.         Behavior: Behavior normal.         Assessment/Plan   Problem List Items Addressed This Visit       Mild persistent asthma - Primary     Other Visit Diagnoses       Mild intermittent asthma without complication        Relevant Medications    budesonide-formoteroL (Symbicort) 160-4.5 mcg/actuation inhaler            Patient understands and agrees with treatment plan    Estuardo Dutton, DO

## 2024-04-10 LAB — BACTERIA UR CULT: NO GROWTH

## 2024-04-23 ENCOUNTER — HOSPITAL ENCOUNTER (OUTPATIENT)
Dept: RADIOLOGY | Facility: CLINIC | Age: 34
Discharge: HOME | End: 2024-04-23
Payer: COMMERCIAL

## 2024-04-23 DIAGNOSIS — Z3A.13 13 WEEKS GESTATION OF PREGNANCY (HHS-HCC): ICD-10-CM

## 2024-04-23 PROCEDURE — 76811 OB US DETAILED SNGL FETUS: CPT | Performed by: OBSTETRICS & GYNECOLOGY

## 2024-04-23 PROCEDURE — 76811 OB US DETAILED SNGL FETUS: CPT

## 2024-04-28 NOTE — PROGRESS NOTES
"Virtual or Telephone Consent    An interactive audio and video telecommunication system which permits real time communications between the patient (at the originating site) and provider (at the distant site) was utilized to provide this telehealth service.   Verbal consent was requested and obtained from Marisol Rebolledo on this date, 24 for a telehealth visit.      Subjective   Patient ID [id number]   Marisol Rebolledo is a 33 y.o.  at 20w0d with a working estimated date of delivery of 2024, by Last Menstrual Period who presents for a routine prenatal visit. She is feeling well and has no physical complaints. She denies n/v, urinary complaints, or vaginal bleeding. She had some general questions that were answered to her satisfaction.     Her pregnancy is complicated by:  -asthma with an acute exacerbation during first trimester   -Hx of pelvic floor dysfunction, follow up with PT through pregnancy      Objective   Physical Exam: NAD, easy respiratory effort, CN grossly intact, no edema; alert & oriented @ baseline.      Expected Total Weight Gain: Could not be calculated   Pregravid BMI: Could not be calculated     Fetal Heart Rate: vv      Prenatal Labs  Urine dip:  Lab Results   Component Value Date    KETONESU NEGATIVE 2024       Lab Results   Component Value Date    HGB 12.2 2024    HCT 37.6 2024    ABO A 2024    HEPBSAG Nonreactive 2024     No results found for: \"PAPPA\", \"AFP\", \"HCG\", \"ESTRIOL\", \"INHBA\"  No results found for: \"GLUF\", \"GLUT1\", \"DSNIJVQ1AJ\", \"UEXPCHW4UN\"    Imaging  See reports.     Marisol was seen today for routine prenatal visit.  Diagnoses and all orders for this visit:  Primigravida, second trimester ([facility]) (Primary)  Maternal varicella, non-immune ([facility]-HCC)  Pelvic floor dysfunction  20 weeks gestation of pregnancy ([facility]CC)       Continue prenatal vitamin.  Labs reviewed and up to date for GA.   Follow up in 4 weeks for a routine prenatal " visit.    Asthma:  Referred to PCP tomorrow  Continued Symbicort for now    Urinary retention:  Pt states she is unable to empty her bladder with voiding.   Send urine culture today.   Consider URO-GYN referral.   Continue PT- having a hard time with insurance covering this, letters have been sent for this.     Pt with chaffing- pt states she had an allergic reaction to nystatin. Discussed powder and corn starch.       Mesha Velasco, APRN-CNM, APRN-CNP

## 2024-04-29 ENCOUNTER — TELEMEDICINE (OUTPATIENT)
Dept: OBSTETRICS AND GYNECOLOGY | Facility: CLINIC | Age: 34
End: 2024-04-29
Payer: COMMERCIAL

## 2024-04-29 DIAGNOSIS — Z3A.20 20 WEEKS GESTATION OF PREGNANCY (HHS-HCC): ICD-10-CM

## 2024-04-29 DIAGNOSIS — O09.899 MATERNAL VARICELLA, NON-IMMUNE (HHS-HCC): ICD-10-CM

## 2024-04-29 DIAGNOSIS — Z34.02 PRIMIGRAVIDA, SECOND TRIMESTER (HHS-HCC): Primary | ICD-10-CM

## 2024-04-29 DIAGNOSIS — M62.89 PELVIC FLOOR DYSFUNCTION: ICD-10-CM

## 2024-04-29 DIAGNOSIS — Z28.39 MATERNAL VARICELLA, NON-IMMUNE (HHS-HCC): ICD-10-CM

## 2024-04-29 PROCEDURE — 1036F TOBACCO NON-USER: CPT | Performed by: NURSE PRACTITIONER

## 2024-04-29 PROCEDURE — 99213 OFFICE O/P EST LOW 20 MIN: CPT | Performed by: NURSE PRACTITIONER

## 2024-05-20 ENCOUNTER — ROUTINE PRENATAL (OUTPATIENT)
Dept: OBSTETRICS AND GYNECOLOGY | Facility: CLINIC | Age: 34
End: 2024-05-20
Payer: COMMERCIAL

## 2024-05-20 VITALS — DIASTOLIC BLOOD PRESSURE: 64 MMHG | SYSTOLIC BLOOD PRESSURE: 118 MMHG | WEIGHT: 208 LBS | BODY MASS INDEX: 29.84 KG/M2

## 2024-05-20 DIAGNOSIS — O09.899 MATERNAL VARICELLA, NON-IMMUNE (HHS-HCC): ICD-10-CM

## 2024-05-20 DIAGNOSIS — Z34.02 PRIMIGRAVIDA, SECOND TRIMESTER (HHS-HCC): Primary | ICD-10-CM

## 2024-05-20 DIAGNOSIS — Z28.39 MATERNAL VARICELLA, NON-IMMUNE (HHS-HCC): ICD-10-CM

## 2024-05-20 DIAGNOSIS — Z3A.23 23 WEEKS GESTATION OF PREGNANCY (HHS-HCC): ICD-10-CM

## 2024-05-20 PROCEDURE — 0501F PRENATAL FLOW SHEET: CPT | Performed by: NURSE PRACTITIONER

## 2024-05-20 NOTE — PROGRESS NOTES
"Subjective   Patient ID [id number]   Marisol Rebolledo is a 33 y.o.  at 23w0d with a working estimated date of delivery of 2024, by Last Menstrual Period who presents for a routine prenatal visit. She denies vaginal bleeding, leakage of fluid, decreased fetal movements, or contractions.    Complains of varicosities.     Pregnancy c/b :  ASTHMA  -acute asthma exacerbation in first trimester after URI  Currently off albuterol, and still using Symbicort twice daily.      Pelvic floor dysfunction:   Continue with PT     Objective   Physical Exam  Weight: 94.3 kg (208 lb)  Expected Total Weight Gain: Could not be calculated   Pregravid BMI: Could not be calculated  BP: 118/64  Fetal Heart Rate: 144 Fundal Height (cm): 25 cm    Prenatal Labs  Urine dip:  Lab Results   Component Value Date    KETONESU NEGATIVE 2024       Lab Results   Component Value Date    HGB 12.2 2024    HCT 37.6 2024    ABO A 2024    HEPBSAG Nonreactive 2024     No results found for: \"PAPPA\", \"AFP\", \"HCG\", \"ESTRIOL\", \"INHBA\"  No results found for: \"GLUF\", \"GLUT1\", \"[id number]\", \"[id number]\"    Imaging  The most recent ultrasound was performed on   with a study GA of   and EFW of  .          Assessment/Plan   Diagnoses and all orders for this visit:  Primigravida, second trimester ([facility])  Maternal varicella, non-immune ([facility]-HCC)  23 weeks gestation of pregnancy ([facility])      Continue prenatal vitamin.  Labs reviewed.  Follow up in 4 weeks for a routine prenatal visit.    Mesha Velasco, APRN-CNM, APRN-CNP   "

## 2024-06-17 ENCOUNTER — APPOINTMENT (OUTPATIENT)
Dept: OBSTETRICS AND GYNECOLOGY | Facility: CLINIC | Age: 34
End: 2024-06-17
Payer: COMMERCIAL

## 2024-06-17 VITALS — WEIGHT: 211 LBS | DIASTOLIC BLOOD PRESSURE: 68 MMHG | BODY MASS INDEX: 30.28 KG/M2 | SYSTOLIC BLOOD PRESSURE: 122 MMHG

## 2024-06-17 DIAGNOSIS — Z3A.26 26 WEEKS GESTATION OF PREGNANCY (HHS-HCC): ICD-10-CM

## 2024-06-17 DIAGNOSIS — Z34.02 PRIMIGRAVIDA, SECOND TRIMESTER (HHS-HCC): Primary | ICD-10-CM

## 2024-06-17 DIAGNOSIS — O09.899 MATERNAL VARICELLA, NON-IMMUNE (HHS-HCC): ICD-10-CM

## 2024-06-17 DIAGNOSIS — Z28.39 MATERNAL VARICELLA, NON-IMMUNE (HHS-HCC): ICD-10-CM

## 2024-06-17 LAB
ERYTHROCYTE [DISTWIDTH] IN BLOOD BY AUTOMATED COUNT: 13.8 % (ref 11.5–14.5)
GLUCOSE 1H P 50 G GLC PO SERPL-MCNC: 110 MG/DL
HCT VFR BLD AUTO: 35 % (ref 36–46)
HGB BLD-MCNC: 12.2 G/DL (ref 12–16)
MCH RBC QN AUTO: 29.3 PG (ref 26–34)
MCHC RBC AUTO-ENTMCNC: 34.9 G/DL (ref 32–36)
MCV RBC AUTO: 84 FL (ref 80–100)
NRBC BLD-RTO: 0 /100 WBCS (ref 0–0)
PLATELET # BLD AUTO: 214 X10*3/UL (ref 150–450)
RBC # BLD AUTO: 4.16 X10*6/UL (ref 4–5.2)
WBC # BLD AUTO: 8.7 X10*3/UL (ref 4.4–11.3)

## 2024-06-17 PROCEDURE — 36415 COLL VENOUS BLD VENIPUNCTURE: CPT

## 2024-06-17 PROCEDURE — 86900 BLOOD TYPING SEROLOGIC ABO: CPT

## 2024-06-17 PROCEDURE — 0501F PRENATAL FLOW SHEET: CPT | Performed by: NURSE PRACTITIONER

## 2024-06-17 PROCEDURE — 85027 COMPLETE CBC AUTOMATED: CPT

## 2024-06-17 PROCEDURE — 86901 BLOOD TYPING SEROLOGIC RH(D): CPT

## 2024-06-17 PROCEDURE — 86850 RBC ANTIBODY SCREEN: CPT

## 2024-06-17 PROCEDURE — 82947 ASSAY GLUCOSE BLOOD QUANT: CPT

## 2024-06-18 ENCOUNTER — LAB REQUISITION (OUTPATIENT)
Dept: LAB | Facility: HOSPITAL | Age: 34
End: 2024-06-18
Payer: COMMERCIAL

## 2024-06-18 DIAGNOSIS — O09.899 SUPERVISION OF OTHER HIGH RISK PREGNANCIES, UNSPECIFIED TRIMESTER (HHS-HCC): ICD-10-CM

## 2024-06-18 DIAGNOSIS — Z34.02 ENCOUNTER FOR SUPERVISION OF NORMAL FIRST PREGNANCY, SECOND TRIMESTER (HHS-HCC): ICD-10-CM

## 2024-06-18 DIAGNOSIS — Z3A.26 26 WEEKS GESTATION OF PREGNANCY (HHS-HCC): ICD-10-CM

## 2024-06-18 DIAGNOSIS — Z28.39 OTHER UNDERIMMUNIZATION STATUS: ICD-10-CM

## 2024-06-18 LAB
ABO GROUP (TYPE) IN BLOOD: NORMAL
ANTIBODY SCREEN: NORMAL
REFLEX ADDED, ANEMIA PANEL: NORMAL
RH FACTOR (ANTIGEN D): NORMAL

## 2024-06-19 ENCOUNTER — TELEPHONE (OUTPATIENT)
Dept: OBSTETRICS AND GYNECOLOGY | Facility: CLINIC | Age: 34
End: 2024-06-19
Payer: COMMERCIAL

## 2024-06-19 NOTE — TELEPHONE ENCOUNTER
"Returned patient call. States she sees \"abnormal\" under result, maybe anemia reflex. Reviewed all results with patient, all normal. No reflex due to normal hemoglobin level. Patient reassured.  "

## 2024-07-01 ENCOUNTER — APPOINTMENT (OUTPATIENT)
Dept: OBSTETRICS AND GYNECOLOGY | Facility: CLINIC | Age: 34
End: 2024-07-01
Payer: COMMERCIAL

## 2024-07-01 VITALS — SYSTOLIC BLOOD PRESSURE: 110 MMHG | WEIGHT: 215.8 LBS | BODY MASS INDEX: 30.96 KG/M2 | DIASTOLIC BLOOD PRESSURE: 70 MMHG

## 2024-07-01 DIAGNOSIS — J45.30 MILD PERSISTENT ASTHMA, UNSPECIFIED WHETHER COMPLICATED (HHS-HCC): ICD-10-CM

## 2024-07-01 DIAGNOSIS — Z34.03 ENCOUNTER FOR SUPERVISION OF NORMAL FIRST PREGNANCY IN THIRD TRIMESTER (HHS-HCC): Primary | ICD-10-CM

## 2024-07-01 DIAGNOSIS — Z3A.29 29 WEEKS GESTATION OF PREGNANCY (HHS-HCC): ICD-10-CM

## 2024-07-01 PROCEDURE — 0501F PRENATAL FLOW SHEET: CPT | Performed by: NURSE PRACTITIONER

## 2024-07-01 NOTE — PROGRESS NOTES
"Subjective   Patient ID [id number]   Marisol Rebolledo is a 33 y.o.  at 29w0d with a working estimated date of delivery of 2024, by Last Menstrual Period who presents for a routine prenatal visit. She denies vaginal bleeding, leakage of fluid, decreased fetal movements, or contractions.    Pt complains of small varicosities on her right labia and ongoing chafing bilateral inner thighs.     Pregnancy c/b :  ASTHMA  -acute asthma exacerbation in first trimester after URI  Currently off albuterol, and still using Symbicort twice daily.      Pelvic floor dysfunction:   Continue with PT     Objective   Physical Exam:   Weight: 97.9 kg (215 lb 12.8 oz)  Expected Total Weight Gain: Could not be calculated   Pregravid BMI: Could not be calculated  BP: 110/70  Fetal Heart Rate: 150 Fundal Height (cm): 30 cm Presentation: Vertex         Vulvar exam:   Right labia- small hemangiomas on right labia. WNL    Prenatal Labs  Urine Dip:  Lab Results   Component Value Date    KETONESU NEGATIVE 2024     Lab Results   Component Value Date    HGB 12.2 2024    HCT 35.0 (L) 2024    ABO A 2024    HEPBSAG Nonreactive 2024     No results found for: \"PAPPA\", \"AFP\", \"HCG\", \"ESTRIOL\", \"INHBA\"  No results found for: \"GLUF\", \"GLUT1\", \"PYTKIDC2TU\", \"HKOLIZZ2PA\"    Imaging  The most recent ultrasound was performed on   with a study GA of   and EFW of  .          Assessment/Plan   Diagnoses and all orders for this visit:  Encounter for supervision of normal first pregnancy in third trimester ([facility]HCC)  Mild persistent asthma, unspecified whether complicated ([facility]CC)  29 weeks gestation of pregnancy ([facility])      Continue prenatal vitamin.  1 hour GCT and CBC obtained today.   RH positive.   Expected mode of delivery .  Follow up in 2 weeks for a routine prenatal visit.  Discussed peds     Mesha Velasco, APRN-CNM, APRN-CNP   "

## 2024-07-22 ENCOUNTER — APPOINTMENT (OUTPATIENT)
Dept: OBSTETRICS AND GYNECOLOGY | Facility: CLINIC | Age: 34
End: 2024-07-22
Payer: COMMERCIAL

## 2024-07-26 ENCOUNTER — ROUTINE PRENATAL (OUTPATIENT)
Dept: OBSTETRICS AND GYNECOLOGY | Facility: CLINIC | Age: 34
End: 2024-07-26
Payer: COMMERCIAL

## 2024-07-26 VITALS — SYSTOLIC BLOOD PRESSURE: 127 MMHG | WEIGHT: 223 LBS | DIASTOLIC BLOOD PRESSURE: 85 MMHG | BODY MASS INDEX: 32 KG/M2

## 2024-07-26 DIAGNOSIS — Z34.03 PRIMIGRAVIDA, THIRD TRIMESTER (HHS-HCC): Primary | ICD-10-CM

## 2024-07-26 DIAGNOSIS — Z3A.32 32 WEEKS GESTATION OF PREGNANCY (HHS-HCC): ICD-10-CM

## 2024-07-26 DIAGNOSIS — Z28.39 MATERNAL VARICELLA, NON-IMMUNE (HHS-HCC): ICD-10-CM

## 2024-07-26 DIAGNOSIS — O09.899 MATERNAL VARICELLA, NON-IMMUNE (HHS-HCC): ICD-10-CM

## 2024-07-26 PROCEDURE — 0501F PRENATAL FLOW SHEET: CPT | Performed by: MIDWIFE

## 2024-07-26 NOTE — PROGRESS NOTES
"Subjective   Patient ID [id number]   Marisol Rebolledo is a 33 y.o.  at 32w4d with a working estimated date of delivery of 2024, by Last Menstrual Period who presents for a routine prenatal visit. She endorses regular fetal movement and denies HA, blurred vision, chest or RUQ pain, abdominal or urinary discomfort, vaginal bleeding or LOF, or edema.  She has had some increased heartburn lately and has not taken anything.     Her pregnancy is complicated by:  -asthma     We reviewed antacids and GERD prevention in pregnancy and she will try this over the next two weeks and reevaluate symptoms for potential sucralfate. She had some questions that were answered to her satisfaction.     Objective   Physical Exam: NAD, easy respiratory effort, CN grossly intact, no edema; alert & oriented @ baseline   Weight: 101 kg (223 lb)  Expected Total Weight Gain: Could not be calculated   Pregravid BMI: Could not be calculated  BP: 127/85                  Prenatal Labs  Urine Dip:  Lab Results   Component Value Date    KETONESU NEGATIVE 2024     Lab Results   Component Value Date    HGB 12.2 2024    HCT 35.0 (L) 2024    ABO A 2024    HEPBSAG Nonreactive 2024     No results found for: \"PAPPA\", \"AFP\", \"HCG\", \"ESTRIOL\", \"INHBA\"  No results found for: \"GLUF\", \"GLUT1\", \"HTCRABQ9JI\", \"[id number]\"    Imaging  See imaging reports.           Assessment/Plan   Diagnoses and all orders for this visit:  Primigravida, third trimester ([facility])  Maternal varicella, non-immune ([facility])  32 weeks gestation of pregnancy ([facility])  Continue prenatal vitamin.  Labs reviewed and up to date.  Expected mode of delivery   Follow up in 2 weeks for a routine prenatal visit.    JIMBO ESPINOZA   "

## 2024-08-05 ENCOUNTER — APPOINTMENT (OUTPATIENT)
Dept: OBSTETRICS AND GYNECOLOGY | Facility: CLINIC | Age: 34
End: 2024-08-05
Payer: COMMERCIAL

## 2024-08-05 VITALS — BODY MASS INDEX: 32.86 KG/M2 | SYSTOLIC BLOOD PRESSURE: 118 MMHG | DIASTOLIC BLOOD PRESSURE: 78 MMHG | WEIGHT: 229 LBS

## 2024-08-05 DIAGNOSIS — J45.30 MILD PERSISTENT ASTHMA, UNSPECIFIED WHETHER COMPLICATED (HHS-HCC): ICD-10-CM

## 2024-08-05 DIAGNOSIS — Z34.03 PRIMIGRAVIDA, THIRD TRIMESTER (HHS-HCC): Primary | ICD-10-CM

## 2024-08-05 DIAGNOSIS — Z3A.34 34 WEEKS GESTATION OF PREGNANCY (HHS-HCC): ICD-10-CM

## 2024-08-05 PROBLEM — Z3A.32 32 WEEKS GESTATION OF PREGNANCY (HHS-HCC): Status: ACTIVE | Noted: 2024-08-05

## 2024-08-05 PROCEDURE — 0501F PRENATAL FLOW SHEET: CPT | Performed by: NURSE PRACTITIONER

## 2024-08-05 PROCEDURE — 90715 TDAP VACCINE 7 YRS/> IM: CPT | Performed by: NURSE PRACTITIONER

## 2024-08-05 PROCEDURE — 90471 IMMUNIZATION ADMIN: CPT | Performed by: NURSE PRACTITIONER

## 2024-08-05 NOTE — PROGRESS NOTES
Subjective   Patient ID [id number]   Marisol Rebolledo is a 33 y.o.  at 34w0d with a working estimated date of delivery of 2024, by Last Menstrual Period who presents for a routine prenatal visit. She endorses regular fetal movement and denies HA, blurred vision, chest or RUQ pain, abdominal or urinary discomfort, vaginal bleeding or LOF, or edema.  She has had some increased heartburn lately and has not taken anything.     Using TUMs and getting relief with OTC.    Her pregnancy is complicated by:  -asthma   -Pelvic floor dysfunction     Objective   Physical Exam: NAD, easy respiratory effort, CN grossly intact, no edema; alert & oriented @ baseline   Weight: 104 kg (229 lb)  Expected Total Weight Gain: Could not be calculated   Pregravid BMI: Could not be calculated  BP: 118/78  Fetal Heart Rate: 140 Fundal Height (cm): 34 cm Presentation: Vertex           Prenatal Labs  Urine Dip:  Lab Results   Component Value Date    KETONESU NEGATIVE 2024     Lab Results   Component Value Date    HGB 12.2 2024    HCT 35.0 (L) 2024    ABO A 2024    HEPBSAG Nonreactive 2024         Assessment/Plan   Diagnoses and all orders for this visit:  Primigravida, third trimester ([facility])  Maternal varicella, non-immune ([facility]-HCC)  34 weeks gestation of pregnancy ([facility])  Continue prenatal vitamin.  Labs reviewed and up to date.  Expected mode of delivery   Follow up in 2 weeks for a routine prenatal visit.    Swelling bilaterally noted, reassured, no s/s of PEC.  S/S of labor reviewed.      Mesha Velasco, APRN-CNM, APRN-CNP

## 2024-08-17 NOTE — PROGRESS NOTES
Subjective   Patient ID [id number]   Marisol Rebolledo is a 33 y.o.  at 36w0d with a working estimated date of delivery of 2024, by Last Menstrual Period who presents for a routine prenatal visit. She endorses regular fetal movement and denies HA, blurred vision, chest or RUQ pain, abdominal or urinary discomfort, vaginal bleeding or LOF, or edema.  She has had some increased heartburn lately and has not taken anything.     Her pregnancy is complicated by:  -asthma   -Pelvic floor dysfunction     Objective   Physical Exam: NAD, easy respiratory effort, CN grossly intact, no edema; alert & oriented @ baseline   Weight: 106 kg (233 lb)  Expected Total Weight Gain: Could not be calculated   Pregravid BMI: Could not be calculated  BP: 126/84  Fetal Heart Rate: 140 Fundal Height (cm): 37 cm             Prenatal Labs  Urine Dip:  Lab Results   Component Value Date    KETONESU NEGATIVE 2024    GLUCOSEUR NEGATIVE 2024     Lab Results   Component Value Date    HGB 12.2 2024    HCT 35.0 (L) 2024    ABO A 2024    HEPBSAG Nonreactive 2024         Assessment/Plan   Diagnoses and all orders for this visit:  Primigravida, third trimester ([facility]-HCC)  Maternal varicella, non-immune ([facility])  36 weeks gestation of pregnancy ([facility])  Continue prenatal vitamin.  Labs reviewed and up to date.  Expected mode of delivery   Follow up in 1 weeks for a routine prenatal visit.    Swelling bilaterally noted, reassured, no s/s of PEC.  S/S of labor reviewed.      Bedside ultrasound for confirmation of vertex position.  Fetal position reassuring patient aware of findings.    Patient states she is allergic to every antibiotic she has ever taken.  Upon review of this complaint I discussed with her reactions versus sensitivity to antibiotics patient states that she has had rashes and has had throat closing.  When I questioned her need for epinephrine with all her antibiotics she states that she has never  "received epinephrine for any of her reactions.  Due to cost of emergency room visits patient states she did not report to the emergency room because they did not want to pay for the visit.  So she just \"tufted this out at home.\"    I discussed with her need for possible antibiotics during her course of labor and birth specifically if GBS is positive.  Patient states that she has had a full body rash when she took amoxicillin while testing positive for mono.  I discussed the option for seeing an allergist for further testing and evaluation to see if she is truly allergic to penicillin.  Patient is willing to consider this option however we are unsure if this will be able to be completed prior to delivery.  Patient's  is present in the room during this discussion and I asked questions specific to her reactions if there was a possibility she had panic attacks with concerns for throat closure.  Her  felt that her throat did not actually close however she was always having severe reactions and always stop the antibiotics.  Her previous PCP was aware of this and patient used more naturopathic medicine due to these concerns for multiple allergies.     Referral to allergist placed.     Possible need for MFM consult if patient requires antibiotics in labor and delivery.     Mesha Velasco, APRN-CNM, APRN-CNP   "

## 2024-08-19 ENCOUNTER — APPOINTMENT (OUTPATIENT)
Dept: OBSTETRICS AND GYNECOLOGY | Facility: CLINIC | Age: 34
End: 2024-08-19
Payer: COMMERCIAL

## 2024-08-19 VITALS — DIASTOLIC BLOOD PRESSURE: 84 MMHG | BODY MASS INDEX: 33.43 KG/M2 | SYSTOLIC BLOOD PRESSURE: 126 MMHG | WEIGHT: 233 LBS

## 2024-08-19 DIAGNOSIS — Z28.39 MATERNAL VARICELLA, NON-IMMUNE (HHS-HCC): ICD-10-CM

## 2024-08-19 DIAGNOSIS — Z3A.36 36 WEEKS GESTATION OF PREGNANCY (HHS-HCC): ICD-10-CM

## 2024-08-19 DIAGNOSIS — J45.30 MILD PERSISTENT ASTHMA, UNSPECIFIED WHETHER COMPLICATED (HHS-HCC): ICD-10-CM

## 2024-08-19 DIAGNOSIS — Z34.03 ENCOUNTER FOR SUPERVISION OF NORMAL FIRST PREGNANCY IN THIRD TRIMESTER (HHS-HCC): Primary | ICD-10-CM

## 2024-08-19 DIAGNOSIS — Z88.0 ALLERGY TO PENICILLIN: ICD-10-CM

## 2024-08-19 DIAGNOSIS — O09.899 MATERNAL VARICELLA, NON-IMMUNE (HHS-HCC): ICD-10-CM

## 2024-08-19 PROCEDURE — 87184 SC STD DISK METHOD PER PLATE: CPT

## 2024-08-19 PROCEDURE — 87081 CULTURE SCREEN ONLY: CPT

## 2024-08-19 PROCEDURE — 0501F PRENATAL FLOW SHEET: CPT | Performed by: NURSE PRACTITIONER

## 2024-08-20 LAB
GLUCOSE URINE, POC: NEGATIVE
URINE PROTEIN, POC: NEGATIVE

## 2024-08-23 LAB — GP B STREP GENITAL QL CULT: ABNORMAL

## 2024-08-26 ENCOUNTER — TELEPHONE (OUTPATIENT)
Dept: OBSTETRICS AND GYNECOLOGY | Facility: CLINIC | Age: 34
End: 2024-08-26
Payer: COMMERCIAL

## 2024-08-26 NOTE — TELEPHONE ENCOUNTER
Spoke with Dr Rhoades's (allergist) nurse. Reviewed indication of urgent allergy referral before time of delivery. She indicates that they will be unable to perform antibiotic allergy testing while pt is pregnant, this would have to happen after delivery. Reviewed that best option would be to have midwife request a peer to peer consult with allergist for advice on how to manage in labor. Pt notified and is agreeable.     Pt also expressed concern for pitting edema in feet/ankles. No edema in face/hands. Only mild recent headache yesterday which resolved quickly. No visual changes or epigastric pain. BP on Friday was 124/86 (pt's baseline BP at most OB visits has been 120s/80s since start of pregnancy). Pt has home BP cuff and will check BP and notify us if it is above 140/90s. Sent BP log/instructions.

## 2024-08-26 NOTE — TELEPHONE ENCOUNTER
Spoke with lab, they will run sensitivity. Called Allergy office to see if they could squeeze pt in within the next couple of weeks and they said visits are completely booked out into October so they are unable to see her before then. Pt reached out about result so I advised her regarding this info and that you would be consulting with an OBGYN and we will update her on care plan once one is made. Thanks!

## 2024-08-26 NOTE — TELEPHONE ENCOUNTER
----- Message from Mesha Velasco sent at 8/26/2024  7:26 AM EDT -----  There was no sensitivity on this. It says to contact the labs within 5 days if PCN allergy. So I am not sure this was marked. I placed and allergy consult for Marisol, but I am unsure what else to do for her. I suppose she would need vancomycin for now. If she can't get in with an allergist within a few weeks, I can consult a physician.

## 2024-08-27 PROBLEM — O99.820 GROUP B STREPTOCOCCUS CARRIER STATE AFFECTING PREGNANCY (HHS-HCC): Status: ACTIVE | Noted: 2024-08-27

## 2024-08-27 PROBLEM — Z3A.37 37 WEEKS GESTATION OF PREGNANCY (HHS-HCC): Status: ACTIVE | Noted: 2024-08-05

## 2024-08-28 LAB — GP B STREP GENITAL QL CULT: ABNORMAL

## 2024-08-29 ENCOUNTER — ANESTHESIA (OUTPATIENT)
Dept: OBSTETRICS AND GYNECOLOGY | Facility: HOSPITAL | Age: 34
End: 2024-08-29
Payer: COMMERCIAL

## 2024-08-29 ENCOUNTER — ROUTINE PRENATAL (OUTPATIENT)
Dept: OBSTETRICS AND GYNECOLOGY | Facility: CLINIC | Age: 34
End: 2024-08-29
Payer: COMMERCIAL

## 2024-08-29 ENCOUNTER — HOSPITAL ENCOUNTER (INPATIENT)
Facility: HOSPITAL | Age: 34
End: 2024-08-29
Attending: OBSTETRICS & GYNECOLOGY | Admitting: OBSTETRICS & GYNECOLOGY
Payer: COMMERCIAL

## 2024-08-29 ENCOUNTER — ANESTHESIA EVENT (OUTPATIENT)
Dept: OBSTETRICS AND GYNECOLOGY | Facility: HOSPITAL | Age: 34
End: 2024-08-29
Payer: COMMERCIAL

## 2024-08-29 ENCOUNTER — APPOINTMENT (OUTPATIENT)
Dept: OBSTETRICS AND GYNECOLOGY | Facility: CLINIC | Age: 34
End: 2024-08-29
Payer: COMMERCIAL

## 2024-08-29 VITALS — BODY MASS INDEX: 33.86 KG/M2 | SYSTOLIC BLOOD PRESSURE: 138 MMHG | WEIGHT: 236 LBS | DIASTOLIC BLOOD PRESSURE: 90 MMHG

## 2024-08-29 DIAGNOSIS — O09.899 MATERNAL VARICELLA, NON-IMMUNE (HHS-HCC): Primary | ICD-10-CM

## 2024-08-29 DIAGNOSIS — Z3A.37 PREGNANCY WITH 37 WEEKS COMPLETED GESTATION (HHS-HCC): ICD-10-CM

## 2024-08-29 DIAGNOSIS — O13.3 GESTATIONAL HYPERTENSION, THIRD TRIMESTER (HHS-HCC): ICD-10-CM

## 2024-08-29 DIAGNOSIS — Z28.39 MATERNAL VARICELLA, NON-IMMUNE (HHS-HCC): Primary | ICD-10-CM

## 2024-08-29 DIAGNOSIS — Z3A.37 37 WEEKS GESTATION OF PREGNANCY (HHS-HCC): ICD-10-CM

## 2024-08-29 DIAGNOSIS — Z34.90 ENCOUNTER FOR INDUCTION OF LABOR (HHS-HCC): ICD-10-CM

## 2024-08-29 DIAGNOSIS — O99.820 GROUP B STREPTOCOCCUS CARRIER STATE AFFECTING PREGNANCY (HHS-HCC): ICD-10-CM

## 2024-08-29 LAB
ABO GROUP (TYPE) IN BLOOD: NORMAL
ALBUMIN SERPL BCP-MCNC: 3.4 G/DL (ref 3.4–5)
ALP SERPL-CCNC: 128 U/L (ref 33–110)
ALT SERPL W P-5'-P-CCNC: 20 U/L (ref 7–45)
ANION GAP SERPL CALC-SCNC: 13 MMOL/L (ref 10–20)
ANTIBODY SCREEN: NORMAL
AST SERPL W P-5'-P-CCNC: 39 U/L (ref 9–39)
BILIRUB SERPL-MCNC: 0.3 MG/DL (ref 0–1.2)
BUN SERPL-MCNC: 12 MG/DL (ref 6–23)
CALCIUM SERPL-MCNC: 8.7 MG/DL (ref 8.6–10.3)
CHLORIDE SERPL-SCNC: 105 MMOL/L (ref 98–107)
CO2 SERPL-SCNC: 20 MMOL/L (ref 21–32)
CREAT SERPL-MCNC: 0.67 MG/DL (ref 0.5–1.05)
CREAT UR-MCNC: 47 MG/DL (ref 20–320)
EGFRCR SERPLBLD CKD-EPI 2021: >90 ML/MIN/1.73M*2
ERYTHROCYTE [DISTWIDTH] IN BLOOD BY AUTOMATED COUNT: 13.6 % (ref 11.5–14.5)
GLUCOSE SERPL-MCNC: 85 MG/DL (ref 74–99)
HCT VFR BLD AUTO: 36.4 % (ref 36–46)
HGB BLD-MCNC: 12.5 G/DL (ref 12–16)
LDH SERPL L TO P-CCNC: 436 U/L (ref 84–246)
MCH RBC QN AUTO: 29.1 PG (ref 26–34)
MCHC RBC AUTO-ENTMCNC: 34.3 G/DL (ref 32–36)
MCV RBC AUTO: 85 FL (ref 80–100)
NRBC BLD-RTO: 0 /100 WBCS (ref 0–0)
PLATELET # BLD AUTO: 179 X10*3/UL (ref 150–450)
POTASSIUM SERPL-SCNC: 5 MMOL/L (ref 3.5–5.3)
PROT SERPL-MCNC: 5.4 G/DL (ref 6.4–8.2)
PROT UR-ACNC: 8 MG/DL (ref 5–24)
PROT/CREAT UR: 0.17 MG/MG CREAT (ref 0–0.17)
RBC # BLD AUTO: 4.29 X10*6/UL (ref 4–5.2)
RH FACTOR (ANTIGEN D): NORMAL
SODIUM SERPL-SCNC: 133 MMOL/L (ref 136–145)
WBC # BLD AUTO: 9.6 X10*3/UL (ref 4.4–11.3)

## 2024-08-29 PROCEDURE — 2500000001 HC RX 250 WO HCPCS SELF ADMINISTERED DRUGS (ALT 637 FOR MEDICARE OP): Performed by: ADVANCED PRACTICE MIDWIFE

## 2024-08-29 PROCEDURE — 1220000001 HC OB SEMI-PRIVATE ROOM DAILY

## 2024-08-29 PROCEDURE — 36415 COLL VENOUS BLD VENIPUNCTURE: CPT | Performed by: OBSTETRICS & GYNECOLOGY

## 2024-08-29 PROCEDURE — 84075 ASSAY ALKALINE PHOSPHATASE: CPT | Performed by: OBSTETRICS & GYNECOLOGY

## 2024-08-29 PROCEDURE — 2500000004 HC RX 250 GENERAL PHARMACY W/ HCPCS (ALT 636 FOR OP/ED): Performed by: OBSTETRICS & GYNECOLOGY

## 2024-08-29 PROCEDURE — 83615 LACTATE (LD) (LDH) ENZYME: CPT | Performed by: OBSTETRICS & GYNECOLOGY

## 2024-08-29 PROCEDURE — 86901 BLOOD TYPING SEROLOGIC RH(D): CPT | Performed by: OBSTETRICS & GYNECOLOGY

## 2024-08-29 PROCEDURE — 86780 TREPONEMA PALLIDUM: CPT | Mod: AHULAB | Performed by: OBSTETRICS & GYNECOLOGY

## 2024-08-29 PROCEDURE — 85027 COMPLETE CBC AUTOMATED: CPT | Performed by: OBSTETRICS & GYNECOLOGY

## 2024-08-29 PROCEDURE — 0501F PRENATAL FLOW SHEET: CPT | Performed by: OBSTETRICS & GYNECOLOGY

## 2024-08-29 PROCEDURE — 3E0P7VZ INTRODUCTION OF HORMONE INTO FEMALE REPRODUCTIVE, VIA NATURAL OR ARTIFICIAL OPENING: ICD-10-PCS | Performed by: ADVANCED PRACTICE MIDWIFE

## 2024-08-29 PROCEDURE — 82570 ASSAY OF URINE CREATININE: CPT | Performed by: OBSTETRICS & GYNECOLOGY

## 2024-08-29 PROCEDURE — 2500000001 HC RX 250 WO HCPCS SELF ADMINISTERED DRUGS (ALT 637 FOR MEDICARE OP): Performed by: OBSTETRICS & GYNECOLOGY

## 2024-08-29 RX ORDER — LABETALOL HYDROCHLORIDE 5 MG/ML
20 INJECTION, SOLUTION INTRAVENOUS ONCE AS NEEDED
Status: DISCONTINUED | OUTPATIENT
Start: 2024-08-29 | End: 2024-09-01

## 2024-08-29 RX ORDER — DIPHENHYDRAMINE HCL 25 MG
50 CAPSULE ORAL EVERY 6 HOURS PRN
Status: DISCONTINUED | OUTPATIENT
Start: 2024-08-29 | End: 2024-08-30

## 2024-08-29 RX ORDER — NIFEDIPINE 10 MG/1
10 CAPSULE ORAL ONCE AS NEEDED
Status: DISCONTINUED | OUTPATIENT
Start: 2024-08-29 | End: 2024-09-01

## 2024-08-29 RX ORDER — ALBUTEROL SULFATE 90 UG/1
2 INHALANT RESPIRATORY (INHALATION) EVERY 4 HOURS PRN
Status: DISCONTINUED | OUTPATIENT
Start: 2024-08-29 | End: 2024-08-30

## 2024-08-29 RX ORDER — METOCLOPRAMIDE HYDROCHLORIDE 5 MG/ML
10 INJECTION INTRAMUSCULAR; INTRAVENOUS EVERY 6 HOURS PRN
Status: DISCONTINUED | OUTPATIENT
Start: 2024-08-29 | End: 2024-09-01

## 2024-08-29 RX ORDER — TERBUTALINE SULFATE 1 MG/ML
0.25 INJECTION SUBCUTANEOUS ONCE AS NEEDED
Status: DISCONTINUED | OUTPATIENT
Start: 2024-08-29 | End: 2024-09-01

## 2024-08-29 RX ORDER — SODIUM CHLORIDE, SODIUM LACTATE, POTASSIUM CHLORIDE, CALCIUM CHLORIDE 600; 310; 30; 20 MG/100ML; MG/100ML; MG/100ML; MG/100ML
125 INJECTION, SOLUTION INTRAVENOUS CONTINUOUS
Status: DISCONTINUED | OUTPATIENT
Start: 2024-08-29 | End: 2024-09-01

## 2024-08-29 RX ORDER — ONDANSETRON 4 MG/1
4 TABLET, FILM COATED ORAL EVERY 6 HOURS PRN
Status: DISCONTINUED | OUTPATIENT
Start: 2024-08-29 | End: 2024-09-01

## 2024-08-29 RX ORDER — METOCLOPRAMIDE 10 MG/1
10 TABLET ORAL EVERY 6 HOURS PRN
Status: DISCONTINUED | OUTPATIENT
Start: 2024-08-29 | End: 2024-09-01

## 2024-08-29 RX ORDER — ONDANSETRON HYDROCHLORIDE 2 MG/ML
4 INJECTION, SOLUTION INTRAVENOUS EVERY 6 HOURS PRN
Status: DISCONTINUED | OUTPATIENT
Start: 2024-08-29 | End: 2024-09-01

## 2024-08-29 RX ORDER — LIDOCAINE HYDROCHLORIDE 10 MG/ML
0.5 INJECTION, SOLUTION EPIDURAL; INFILTRATION; INTRACAUDAL; PERINEURAL ONCE AS NEEDED
Status: DISCONTINUED | OUTPATIENT
Start: 2024-08-29 | End: 2024-09-01

## 2024-08-29 RX ORDER — HYDRALAZINE HYDROCHLORIDE 20 MG/ML
5 INJECTION INTRAMUSCULAR; INTRAVENOUS ONCE AS NEEDED
Status: DISCONTINUED | OUTPATIENT
Start: 2024-08-29 | End: 2024-09-01

## 2024-08-29 SDOH — HEALTH STABILITY: MENTAL HEALTH: CURRENT SMOKER: 0

## 2024-08-29 ASSESSMENT — PAIN SCALES - GENERAL
PAINLEVEL_OUTOF10: 0 - NO PAIN

## 2024-08-29 NOTE — SIGNIFICANT EVENT
S: Pt complaining of itching in hand, left side of neck and head after an hour of vancomycin. 10 minutes after antibiotic turned off, patient states that she feels completely better and would like to eat dinner. Denies any respiratory symptoms.     O:   Patient without erythema, edema or rash on face, hand or neck. Streak of erythema noted on left side of neck from patient scratching; now improving  FHR     Baseline: 130     Variability: moderate     Accels: present     Decels: none     Category: CAT 1   TOCO: mild contractions not felt by patient every 1-4 minutes  Membrane status: intact       A:  IUP @ 37.3 weeks  GHTN  Induction of labor  GBS +  Multiple antibiotic allergies  Category 1 tracing  Labor phase: cervical ripening     P:  Vancomycin turned off temporarily; will give benadryl after patient eats and plan to restart antibiotic  Continue cytotec per protocol; next dose due at 20:37  Pain control as needed  Anticipate    Dr. Smith aware of patient status and plan of care.     ADRIANA Dominguez-CORRINE

## 2024-08-29 NOTE — H&P
Note Type:  OB Admission H&P    ASSESSMENT & PLAN: Marisol Rebolledo is a 34 y.o.  at 37w3d. FELICITAS: 2024, by Last Menstrual Period.     Diagnosis: Gestational Hypertension  -Diagnosed based on: elevated blood pressures  -Blood pressure goal <160/110  -Short acting medications received? No   -Long acting antihypertensive: No  -Preeclampsia labs: Pending    Plan  -Admit to L&D, consented  - corticosteroids: not indicated  -Magnesium for seizure prophylaxis: not indicated  -GBS prophylaxis: indicated    Fetal Status  -NST reactive, reassuring   -Presentation vertex based on bedside ultrasound  -EFW #7 by leopolds -BMZ no  -1hr 110  -GBS POS  -NICU consult N/A    -Postpartum Contraception Plan:  unsure at this time    Pregnancy Problems (from 24 to present)       Problem Noted Resolved    Gestational hypertension, third trimester ([facility]) 2024 by Rosalinda Washington MD No    Priority:  Medium      Overview Signed 2024 11:19 AM by Rosalinda Washington MD     Mild range BP elevation is noted at 37 weeks. This is documented in office and home BP log. She denies s/s of preeclampsia but notes worsening edema.   She will present to labor and delivery for evaluation and likely delivery.         Pregnancy with 37 weeks completed gestation ([facility]) 2024 by Sandy Cespedes, DO No    Priority:  Medium      Encounter for induction of labor ([facility]) 2024 by Sandy Cespedes, DO No    Priority:  Medium      Group B Streptococcus carrier state affecting pregnancy ([facility]) 2024 by Rosalinda Washington MD No    Priority:  Medium      Overview Addendum 2024 12:03 PM by Rosalinda Washington MD     Patient has multiple allergies to various antibiotics. GBBS is resistant to clindamycin. Plan vancomycin while in labor.          37 weeks gestation of pregnancy ([facility]) 2024 by Mesha Velasco, APRN-CNM, APRN-CNP No    Priority:  Medium      Primigravida, third trimester ([facility])  2024 by ALEXIS Garcia, APRN-CNP No    Priority:  Medium      Maternal varicella, non-immune ([facility]) 2024 by ALEXIS Garcia, APRN-CNP No    Priority:  Medium      Overview Signed 2024  8:43 PM by ALEXIS Garcia, APRN-CNP     Vaccinate PP                 Subjective   Good fetal movement.  Denies vaginal bleeding., Denies contractions., Denies leaking of fluid.      Prenatal Provider Nell Washington    OB History    Para Term  AB Living   1 0 0 0 0 0   SAB IAB Ectopic Multiple Live Births   0 0 0 0 0      # Outcome Date GA Lbr Bernardo/2nd Weight Sex Type Anes PTL Lv   1 Current                Past Surgical History:   Procedure Laterality Date    OTHER SURGICAL HISTORY  2014    Alveoloplasty With Tooth Extraction 1-3 Teeth Per Quadrant    OTHER SURGICAL HISTORY  2020    Endoscopy       Social History     Tobacco Use    Smoking status: Never    Smokeless tobacco: Never   Substance Use Topics    Alcohol use: Not Currently     Alcohol/week: 1.0 standard drink of alcohol     Types: 1 Glasses of wine per week       Allergies   Allergen Reactions    Amoxicillin Hives    Azithromycin Hives    Cefdinir Hives    Doxycycline Hives    Levofloxacin Other     Severe leg pain     Nystatin Hives    Sulfa (Sulfonamide Antibiotics) Anaphylaxis    Prednisone Unknown       Medications Prior to Admission   Medication Sig Dispense Refill Last Dose    budesonide-formoteroL (Symbicort) 160-4.5 mcg/actuation inhaler Inhale 1 puff 2 times a day. Rinse mouth with water after use to reduce aftertaste and incidence of candidiasis. Do not swallow. 10.2 g 11 2024    PRENATAL 2-IRON-FOLIC ACID-OM3 ORAL Take 1 tablet by mouth once daily.   2024    prenatal vitamin, iron-folic, 27 mg iron-800 mcg folic acid tablet Take 1 tablet by mouth once daily. 30 tablet 0 2024    albuterol 0.63 mg/3 mL nebulizer solution Take 3 mL (0.63 mg) by nebulization every 6  hours if needed for wheezing. (Patient not taking: Reported on 8/29/2024) 75 mL 11 More than a month    albuterol 90 mcg/actuation inhaler Inhale 2 puffs every 4 hours if needed for wheezing. (Patient not taking: Reported on 8/29/2024) 18 g 1 More than a month    nebulizer and compressor device 1 Device every 6 hours if needed (as needed for cough/wheezing). (Patient not taking: Reported on 8/29/2024) 1 each 0 More than a month     Objective     Last Vitals  Temp Pulse Resp BP MAP O2 Sat   37.2 °C (99 °F) 80 16 (!) 150/88 115 97 %     Blood Pressures         8/29/2024  1606             BP: 150/88             Physical Exam  General: NAD, mood appropriate  Cardiopulmonary: warm and well perfused, breathing comfortably on room air  Abdomen: Gravid, non-tender  Extremities: full range of motion  Speculum Exam: deferred  Cervix: 1 /60 /  -2     Fetal Monitoring  Baseline: 135 bpm, Variability: moderate,  Accelerations: present and Decelerations: none    Uterine Activity: Irregular contractions    Interpretation: Category 1    Bedside ultrasound: Yes    Labs in chart were reviewed.   Results from last 7 days   Lab Units 08/29/24  1638   WBC AUTO x10*3/uL 9.6   HEMOGLOBIN g/dL 12.5   HEMATOCRIT % 36.4   PLATELETS AUTO x10*3/uL 179   AST U/L 39   ALT U/L 20   CREATININE mg/dL 0.67        Prenatal labs reviewed, not remarkable.

## 2024-08-29 NOTE — PROGRESS NOTES
Subjective   Patient ID [id number]   Marisol Rebolledo is a 34 y.o.   at 37w3d with a working estimated date of delivery of 2024, by Last Menstrual Period who presents for a routine prenatal visit. She denies vaginal bleeding, leakage of fluid, decreased fetal movements, or contractions.  She is GBBS positive and has concerns regarding antibiotic administration. She has had reaction to several antibiotics in the past. She does not remember what happened with each of these, but she has had to go to the hospital several times due to reactions. She had anaphylaxis to sulfa. She had tendon issues following levofloxacin. She does think she had to call the office after several days of taking many of the antibiotics and she does not remember what the reaction was to most of them. She admits she gets anxious with taking antibiotics and is not sure if some of the reactions could be anxiety related. She also states she was allergic to prednisone but she can take dexamethasone. We reviewed plan for vancomycin during labor.     Her BP is elevated today. She has started checking her pressures at home and brings log. Two elevated pressures are logged at 142/91 and 112/98. She notes increased edema. She denies any other s/s of preeclampsia.    Objective   Physical Exam  Weight: 107 kg (236 lb)  Expected Total Weight Gain: Could not be calculated   Pregravid BMI: Could not be calculated  BP: 138/90  Fetal Heart Rate: 145 Fundal Height (cm): 36 cm    Prenatal Labs  Urine dip:  Lab Results   Component Value Date    KETONESU NEGATIVE 2024    GLUCOSEUR NEGATIVE 2024       Lab Results   Component Value Date    HGB 12.2 2024    HCT 35.0 (L) 2024    ABO A 2024    HEPBSAG Nonreactive 2024         Problem List Items Addressed This Visit       Maternal varicella, non-immune ([facility]) - Primary    Overview     Vaccinate PP         Group B Streptococcus carrier state affecting pregnancy ([facility])     Overview     Patient has multiple allergies to various antibiotics. GBBS is resistant to clindamycin. Plan vancomycin while in labor.          Gestational hypertension, third trimester ([facility])    Overview     Mild range BP elevation is noted at 37 weeks. This is documented in office and home BP log. She denies s/s of preeclampsia but notes worsening edema.   She will present to labor and delivery for evaluation and likely delivery.         37 weeks gestation of pregnancy ([facility])        Continue prenatal vitamin.  Labs reviewed.  She will present to labor and delivery for evaluation of gestational hypertension.  Follow up as scheduled for a routine prenatal visit.

## 2024-08-29 NOTE — ANESTHESIA PREPROCEDURE EVALUATION
Patient: Marisol Rebolledo    Evaluation Method: In-person visit    Procedure Information    Date: 24  Procedure: Labor Consult       34 year old  37w3d IOL gHTN. GERD, Sciatica, CTS with pregnancy.     H/o asthma, daily inhaler use. H/o hospitalization several years ago per patient, no intubations. H/o asthma exacerbation during this pregnancy, per patient.    Mild scoliosis, no intervention.    Plans NCB, discussed R/B/A to epidural.    Patient denies personal or family h/o problems with GA.    Relevant Problems   Anesthesia (within normal limits)      Pulmonary   (+) Mild persistent asthma ([facility])      GI   (+) GERD (gastroesophageal reflux disease)      Musculoskeletal   (+) Dextroscoliosis   (+) TMJ syndrome      Circulatory   (+) Gestational hypertension, third trimester ([facility])      Gravid and    (+) Encounter for induction of labor ([facility])       Clinical information reviewed:   Tobacco  Allergies  Meds  Problems  Med Hx  Surg Hx   Fam Hx  Soc   Hx        NPO Detail:  NPO/Void Status  Date of Last Solid: 24  Time of Last Solid:   Last Intake Type: Food (Banana)         OB/Gyn Evaluation    Present Pregnancy    Patient is pregnant now.  (+) , hypertensive disorder of pregnancy - gestational hypertension   Obstetric History                Physical Exam    Airway  Mallampati: II  TM distance: >3 FB  Neck ROM: full     Cardiovascular - normal exam  Rhythm: regular  Rate: normal     Dental - normal exam     Pulmonary - normal exam  Breath sounds clear to auscultation     Abdominal - normal exam    Comments: gravid           Anesthesia Plan    History of general anesthesia?: yes  History of complications of general anesthesia?: no    ASA 2     epidural     The patient is not a current smoker.    Anesthetic plan and risks discussed with patient.  Use of blood products discussed with patient who consented to blood products.      I informed and discussed the risks and benefits of  general, spinal and epidural anesthesia with the patient.  The patient expressed her understanding and her questions were answered.  A verbal consent was given by the patient.

## 2024-08-30 ENCOUNTER — ANESTHESIA EVENT (OUTPATIENT)
Dept: OBSTETRICS AND GYNECOLOGY | Facility: HOSPITAL | Age: 34
End: 2024-08-30
Payer: COMMERCIAL

## 2024-08-30 ENCOUNTER — ANESTHESIA (OUTPATIENT)
Dept: OBSTETRICS AND GYNECOLOGY | Facility: HOSPITAL | Age: 34
End: 2024-08-30
Payer: COMMERCIAL

## 2024-08-30 LAB — TREPONEMA PALLIDUM IGG+IGM AB [PRESENCE] IN SERUM OR PLASMA BY IMMUNOASSAY: NONREACTIVE

## 2024-08-30 PROCEDURE — 2500000005 HC RX 250 GENERAL PHARMACY W/O HCPCS: Performed by: OBSTETRICS & GYNECOLOGY

## 2024-08-30 PROCEDURE — 3700000014 EPIDURAL BLOCK: Performed by: NURSE ANESTHETIST, CERTIFIED REGISTERED

## 2024-08-30 PROCEDURE — 88307 TISSUE EXAM BY PATHOLOGIST: CPT | Mod: TC,AHULAB | Performed by: OBSTETRICS & GYNECOLOGY

## 2024-08-30 PROCEDURE — 59400 OBSTETRICAL CARE: CPT | Performed by: OBSTETRICS & GYNECOLOGY

## 2024-08-30 PROCEDURE — 0KQM0ZZ REPAIR PERINEUM MUSCLE, OPEN APPROACH: ICD-10-PCS | Performed by: OBSTETRICS & GYNECOLOGY

## 2024-08-30 PROCEDURE — 2500000005 HC RX 250 GENERAL PHARMACY W/O HCPCS: Performed by: NURSE ANESTHETIST, CERTIFIED REGISTERED

## 2024-08-30 PROCEDURE — 2500000004 HC RX 250 GENERAL PHARMACY W/ HCPCS (ALT 636 FOR OP/ED): Performed by: OBSTETRICS & GYNECOLOGY

## 2024-08-30 PROCEDURE — 88307 TISSUE EXAM BY PATHOLOGIST: CPT | Performed by: PATHOLOGY

## 2024-08-30 PROCEDURE — 1100000001 HC PRIVATE ROOM DAILY

## 2024-08-30 PROCEDURE — 2500000001 HC RX 250 WO HCPCS SELF ADMINISTERED DRUGS (ALT 637 FOR MEDICARE OP): Performed by: OBSTETRICS & GYNECOLOGY

## 2024-08-30 PROCEDURE — 2500000004 HC RX 250 GENERAL PHARMACY W/ HCPCS (ALT 636 FOR OP/ED): Performed by: NURSE ANESTHETIST, CERTIFIED REGISTERED

## 2024-08-30 PROCEDURE — 2500000001 HC RX 250 WO HCPCS SELF ADMINISTERED DRUGS (ALT 637 FOR MEDICARE OP): Performed by: ADVANCED PRACTICE MIDWIFE

## 2024-08-30 RX ORDER — OXYTOCIN 10 [USP'U]/ML
10 INJECTION, SOLUTION INTRAMUSCULAR; INTRAVENOUS ONCE AS NEEDED
Status: DISCONTINUED | OUTPATIENT
Start: 2024-08-30 | End: 2024-09-04 | Stop reason: HOSPADM

## 2024-08-30 RX ORDER — OXYTOCIN 10 [USP'U]/ML
10 INJECTION, SOLUTION INTRAMUSCULAR; INTRAVENOUS ONCE AS NEEDED
Status: DISCONTINUED | OUTPATIENT
Start: 2024-08-30 | End: 2024-09-01

## 2024-08-30 RX ORDER — OXYTOCIN/0.9 % SODIUM CHLORIDE 30/500 ML
60 PLASTIC BAG, INJECTION (ML) INTRAVENOUS ONCE AS NEEDED
Status: DISCONTINUED | OUTPATIENT
Start: 2024-08-30 | End: 2024-09-04 | Stop reason: HOSPADM

## 2024-08-30 RX ORDER — CARBOPROST TROMETHAMINE 250 UG/ML
250 INJECTION, SOLUTION INTRAMUSCULAR ONCE AS NEEDED
Status: DISCONTINUED | OUTPATIENT
Start: 2024-08-30 | End: 2024-09-01

## 2024-08-30 RX ORDER — FENTANYL/ROPIVACAINE/NS/PF 2MCG/ML-.2
0-25 PLASTIC BAG, INJECTION (ML) INJECTION CONTINUOUS
Status: DISCONTINUED | OUTPATIENT
Start: 2024-08-30 | End: 2024-09-01

## 2024-08-30 RX ORDER — ONDANSETRON HYDROCHLORIDE 2 MG/ML
4 INJECTION, SOLUTION INTRAVENOUS EVERY 6 HOURS PRN
Status: DISCONTINUED | OUTPATIENT
Start: 2024-08-30 | End: 2024-09-04 | Stop reason: HOSPADM

## 2024-08-30 RX ORDER — DIPHENHYDRAMINE HCL 25 MG
50 CAPSULE ORAL EVERY 6 HOURS PRN
Status: CANCELLED | OUTPATIENT
Start: 2024-08-30

## 2024-08-30 RX ORDER — ACETAMINOPHEN 325 MG/1
975 TABLET ORAL EVERY 6 HOURS SCHEDULED
Status: DISCONTINUED | OUTPATIENT
Start: 2024-08-30 | End: 2024-09-04 | Stop reason: HOSPADM

## 2024-08-30 RX ORDER — TRANEXAMIC ACID 100 MG/ML
1000 INJECTION, SOLUTION INTRAVENOUS ONCE AS NEEDED
Status: DISCONTINUED | OUTPATIENT
Start: 2024-08-30 | End: 2024-09-04 | Stop reason: HOSPADM

## 2024-08-30 RX ORDER — LOPERAMIDE HYDROCHLORIDE 2 MG/1
4 CAPSULE ORAL EVERY 2 HOUR PRN
Status: DISCONTINUED | OUTPATIENT
Start: 2024-08-30 | End: 2024-09-01

## 2024-08-30 RX ORDER — LIDOCAINE 560 MG/1
1 PATCH PERCUTANEOUS; TOPICAL; TRANSDERMAL
Status: DISCONTINUED | OUTPATIENT
Start: 2024-08-30 | End: 2024-09-04 | Stop reason: HOSPADM

## 2024-08-30 RX ORDER — ADHESIVE BANDAGE
10 BANDAGE TOPICAL
Status: DISCONTINUED | OUTPATIENT
Start: 2024-08-30 | End: 2024-09-04 | Stop reason: HOSPADM

## 2024-08-30 RX ORDER — DIPHENHYDRAMINE HCL 25 MG
25 CAPSULE ORAL EVERY 6 HOURS PRN
Status: DISCONTINUED | OUTPATIENT
Start: 2024-08-30 | End: 2024-08-30

## 2024-08-30 RX ORDER — DIPHENHYDRAMINE HCL 25 MG
50 CAPSULE ORAL EVERY 6 HOURS PRN
Status: DISCONTINUED | OUTPATIENT
Start: 2024-08-30 | End: 2024-09-01

## 2024-08-30 RX ORDER — ENOXAPARIN SODIUM 100 MG/ML
40 INJECTION SUBCUTANEOUS EVERY 24 HOURS
Status: DISCONTINUED | OUTPATIENT
Start: 2024-08-31 | End: 2024-09-04 | Stop reason: HOSPADM

## 2024-08-30 RX ORDER — IBUPROFEN 600 MG/1
600 TABLET ORAL EVERY 6 HOURS SCHEDULED
Status: DISCONTINUED | OUTPATIENT
Start: 2024-08-30 | End: 2024-09-04 | Stop reason: HOSPADM

## 2024-08-30 RX ORDER — TRANEXAMIC ACID 100 MG/ML
1000 INJECTION, SOLUTION INTRAVENOUS ONCE AS NEEDED
Status: DISCONTINUED | OUTPATIENT
Start: 2024-08-30 | End: 2024-09-01

## 2024-08-30 RX ORDER — SIMETHICONE 80 MG
80 TABLET,CHEWABLE ORAL 4 TIMES DAILY PRN
Status: DISCONTINUED | OUTPATIENT
Start: 2024-08-30 | End: 2024-09-04 | Stop reason: HOSPADM

## 2024-08-30 RX ORDER — LABETALOL HYDROCHLORIDE 5 MG/ML
20 INJECTION, SOLUTION INTRAVENOUS ONCE AS NEEDED
Status: DISCONTINUED | OUTPATIENT
Start: 2024-08-30 | End: 2024-09-04 | Stop reason: HOSPADM

## 2024-08-30 RX ORDER — LOPERAMIDE HYDROCHLORIDE 2 MG/1
4 CAPSULE ORAL EVERY 2 HOUR PRN
Status: DISCONTINUED | OUTPATIENT
Start: 2024-08-30 | End: 2024-09-04 | Stop reason: HOSPADM

## 2024-08-30 RX ORDER — ALBUTEROL SULFATE 90 UG/1
2 INHALANT RESPIRATORY (INHALATION) EVERY 4 HOURS PRN
Status: DISCONTINUED | OUTPATIENT
Start: 2024-08-30 | End: 2024-09-01

## 2024-08-30 RX ORDER — OXYTOCIN/0.9 % SODIUM CHLORIDE 30/500 ML
60 PLASTIC BAG, INJECTION (ML) INTRAVENOUS ONCE AS NEEDED
Status: DISCONTINUED | OUTPATIENT
Start: 2024-08-30 | End: 2024-09-01

## 2024-08-30 RX ORDER — METHYLERGONOVINE MALEATE 0.2 MG/ML
0.2 INJECTION INTRAVENOUS ONCE AS NEEDED
Status: DISCONTINUED | OUTPATIENT
Start: 2024-08-30 | End: 2024-09-04 | Stop reason: HOSPADM

## 2024-08-30 RX ORDER — OXYTOCIN/0.9 % SODIUM CHLORIDE 30/500 ML
2-30 PLASTIC BAG, INJECTION (ML) INTRAVENOUS CONTINUOUS
Status: DISCONTINUED | OUTPATIENT
Start: 2024-08-30 | End: 2024-09-01

## 2024-08-30 RX ORDER — ONDANSETRON 4 MG/1
4 TABLET, FILM COATED ORAL EVERY 6 HOURS PRN
Status: DISCONTINUED | OUTPATIENT
Start: 2024-08-30 | End: 2024-09-04 | Stop reason: HOSPADM

## 2024-08-30 RX ORDER — LIDOCAINE HYDROCHLORIDE 10 MG/ML
30 INJECTION INFILTRATION; PERINEURAL ONCE AS NEEDED
Status: COMPLETED | OUTPATIENT
Start: 2024-08-30 | End: 2024-08-30

## 2024-08-30 RX ORDER — BISACODYL 10 MG/1
10 SUPPOSITORY RECTAL DAILY PRN
Status: DISCONTINUED | OUTPATIENT
Start: 2024-08-30 | End: 2024-09-04 | Stop reason: HOSPADM

## 2024-08-30 RX ORDER — POLYETHYLENE GLYCOL 3350 17 G/17G
17 POWDER, FOR SOLUTION ORAL 2 TIMES DAILY PRN
Status: DISCONTINUED | OUTPATIENT
Start: 2024-08-30 | End: 2024-09-04 | Stop reason: HOSPADM

## 2024-08-30 RX ORDER — HYDRALAZINE HYDROCHLORIDE 20 MG/ML
5 INJECTION INTRAMUSCULAR; INTRAVENOUS ONCE AS NEEDED
Status: DISCONTINUED | OUTPATIENT
Start: 2024-08-30 | End: 2024-09-04 | Stop reason: HOSPADM

## 2024-08-30 RX ORDER — LIDOCAINE HYDROCHLORIDE AND EPINEPHRINE 15; 5 MG/ML; UG/ML
INJECTION, SOLUTION EPIDURAL AS NEEDED
Status: DISCONTINUED | OUTPATIENT
Start: 2024-08-30 | End: 2024-08-30

## 2024-08-30 RX ORDER — METHYLERGONOVINE MALEATE 0.2 MG/ML
0.2 INJECTION INTRAVENOUS ONCE AS NEEDED
Status: DISCONTINUED | OUTPATIENT
Start: 2024-08-30 | End: 2024-09-01

## 2024-08-30 RX ORDER — NIFEDIPINE 10 MG/1
10 CAPSULE ORAL ONCE AS NEEDED
Status: DISCONTINUED | OUTPATIENT
Start: 2024-08-30 | End: 2024-09-04 | Stop reason: HOSPADM

## 2024-08-30 RX ORDER — MISOPROSTOL 200 UG/1
800 TABLET ORAL ONCE AS NEEDED
Status: DISCONTINUED | OUTPATIENT
Start: 2024-08-30 | End: 2024-09-01

## 2024-08-30 RX ORDER — BUDESONIDE AND FORMOTEROL FUMARATE DIHYDRATE 160; 4.5 UG/1; UG/1
2 AEROSOL RESPIRATORY (INHALATION)
Status: DISCONTINUED | OUTPATIENT
Start: 2024-08-30 | End: 2024-09-01

## 2024-08-30 RX ORDER — CARBOPROST TROMETHAMINE 250 UG/ML
250 INJECTION, SOLUTION INTRAMUSCULAR ONCE AS NEEDED
Status: DISCONTINUED | OUTPATIENT
Start: 2024-08-30 | End: 2024-09-04 | Stop reason: HOSPADM

## 2024-08-30 RX ORDER — MISOPROSTOL 200 UG/1
800 TABLET ORAL ONCE AS NEEDED
Status: DISCONTINUED | OUTPATIENT
Start: 2024-08-30 | End: 2024-09-04 | Stop reason: HOSPADM

## 2024-08-30 SDOH — HEALTH STABILITY: MENTAL HEALTH: CURRENT SMOKER: 0

## 2024-08-30 ASSESSMENT — PAIN SCALES - GENERAL
PAINLEVEL_OUTOF10: 7
PAINLEVEL_OUTOF10: 2
PAINLEVEL_OUTOF10: 7
PAINLEVEL_OUTOF10: 8
PAIN_LEVEL: 4
PAINLEVEL_OUTOF10: 7

## 2024-08-30 NOTE — PROGRESS NOTES
S: Pt reports mild cramping with contractions.  Continues to refuse CRB placement at this time. Would like to sleep after taking benadryl prior to 2nd dose of vancomycin.     O: Cervical Exam: deferred per patient request  Presentation: vtx  FHR     Baseline: 130     Variability: moderate      Accels: present      Decels: absent      Category: !   TOCO: contractions every 1-2 minutes of mild intensity per patient  Membrane status: Intact        A:  IUP @ 37.4  GBS pos  Category 1 tracing  Labor phase: cervical ripening     P:  Cytotec held as contractions have become more frequent, every 1-3 min  50mg of benadryl given by mouth prior to starting second dose of vancomycin  Discussed CRB placement and cervical exam with patient, both are declined at this time as she would like to sleep  Will reassess patient status in 3 hours or sooner as needed   Plan to start Pitocin per protocol if contractions space out   Pain control as needed  Anticipate    Dr. Smith aware of patient status and plan of care.    ADRIANA Dominguez-CORRINE

## 2024-08-30 NOTE — SIGNIFICANT EVENT
Pushing well for about 2hrs at this time. Station at this time +1, close to +2.  Fh remains stable with moderate variability and accels. +variable decels noted with some contractions.  Will continue pushing at this time.  Dr. Mary aware of status.

## 2024-08-30 NOTE — PROGRESS NOTES
S: Pt with SROM at 0400 of clear fluid, confirmed by Elsy Nix RN. Contractions are now every 2 minutes of strong intensity; patient rates her pain 10/10 and requests epidural at this time.     O: Cervical Exam:  per Elsy Nix RN  Presentation: vtx  FHR     Baseline: 130s     Variability: moderate     Accels: present     Decels: absent      Category: 1  TOCO: contractions every 2 minutes; strong per palpation  Membrane status: AROM at 0400      Color of fluid: clear    A:  IUP @ 37.4  GBS +  Category 1 tracing  Labor phase: latent     P:  Anesthesia notified and to patient's room for epidural placement   CEFM  Frequent position changes   Continue labor support  Pitocin per protocol as needed   Anticipate   Dr. Smith aware of patient status and plan of care.     ADRIANA Dominguez-CORRINE

## 2024-08-30 NOTE — L&D DELIVERY NOTE
OB Delivery Note  2024  Marisol Rebolledo  34 y.o.   Vaginal, Forceps       Gestational Age: 37w4d  /Para:   Quantitative Blood Loss: Admission to Discharge: 0 mL (2024  3:32 PM - 2024 12:22 PM)    Patient presents twisting on her back with her legs pulled back.  The infant was at +3 station and in the ZACK position.  She had been pushing for 4 hours and we had a talk concerning the risks and the benefits of an operative delivery and she consented to a forcep delivery.  Her bladder was emptied and a Gama Sánchez forceps were placed and with maternal pushing in 1 pole the fetal vertex was delivered.  The anterior and posterior shoulders were delivered as was the rest of the infant and the infant was handed off to nursing personnel after delayed cord clamping.  The placenta was delivered spontaneously and intact.  She had a second-degree midline which was repaired using 3-0 Vicryl suture in a normal standard fashion.  Sponge lap and needle counts were correct x 2    Juan Luis RebolledoChong [[id number]]      Labor Events    Rupture type: Spontaneous  Fluid color: Clear  Fluid odor: None  Complications: None       Labor Event Times    Dilation complete date/time: 2024 0715  Start pushing date/time: 2024 0810       Placenta    Placenta delivery date/time:   Placenta removal: Spontaneous  Placenta appearance: Intact  Placenta disposition: pathology       Cord    Vessels: 3 vessels  Complications: None  Delayed cord clamping?: Yes  Cord blood disposition: Lab  Gases sent?: No  Stem cell collection (by provider): No       Lacerations    Episiotomy: None  Perineal laceration: 2nd  Other lacerations?: No  Repair suture: 3-0 Synthetic Suture       Operative Delivery    Forceps attempted?: Yes  Forceps indications: Maternal Fatigue  Forceps type: Gama-Davonte  Forceps application location: Outlet  Number of pulls with forceps: 1  Failed forceps delivery?: No  Vacuum extractor attempted?: No        Shoulder Dystocia    Shoulder dystocia present?: No       [place] Delivery    Time head delivered: 2024 11:58:00  Birth date/time: 2024 11:58:00  Delivery type: Vaginal, Forceps  Complications: None       Resuscitation    Method: None       Apgars    Living status:   Apgar Component Scores:  1 min.:  5 min.:  10 min.:  15 min.:  20 min.:    Skin color:  1  1       Heart rate:  2  2       Reflex irritability:  2  2       Muscle tone:  2  2       Respiratory effort:  2  2       Total:  9  9       Apgars assigned by: HOLLIS MCELROY RN       Delivery Providers    Delivering clinician:    Provider Role     Delivery Nurse     Nursery Nurse     Resident                     Randy Mary, DO

## 2024-08-30 NOTE — ANESTHESIA PROCEDURE NOTES
Epidural Block    Patient location during procedure: OB  Start time: 8/30/2024 4:45 AM  End time: 8/30/2024 5:08 AM  Reason for block: labor analgesia  Staffing  Performed: CRNA   Authorized by: MYRON Kimball    Performed by: MYRON Kimball    Preanesthetic Checklist  Completed: patient identified, IV checked, risks and benefits discussed, surgical consent, monitors and equipment checked, pre-op evaluation, timeout performed and sterile techniques followed  Block Timeout  RN/Licensed healthcare professional reads aloud to the Anesthesia provider and entire team: Patient identity, procedure with side and site, patient position, and as applicable the availability of implants/special equipment/special requirements.  Patient on coagulant treatment: no  Timeout performed at: 8/30/2024 4:49 AM  Block Placement  Patient position: sitting  Prep: ChloraPrep  Sterility prep: cap, drape, gloves, hand and mask  Sedation level: no sedation  Patient monitoring: blood pressure, continuous pulse oximetry and heart rate  Approach: midline  Local numbing: lidocaine 1% to skin and subcutaneous tissues  Vertebral space: lumbar  Lumbar location: L2-L3  Epidural  Loss of resistance technique: saline  Guidance: landmark technique        Needle  Needle type: Tuohy   Needle gauge: 17  Needle length: 8.9cm  Needle insertion depth: 6 cm  Catheter type: end hole  Catheter size: 19 G  Catheter at skin depth: 12 cm  Catheter securement method: clear occlusive dressing    Test dose: lidocaine 1.5% with epinephrine 1-to-200,000  Test dose: lidocaine 1.5% with epinephrine 1-to-200,000  Test dose result: no positive test dose    PCEA  Medication concentration used: 0.2% Ropivacaine with 2 mcg/mL Fentanyl  Dose (mL): 5  Lockout (minutes): 20  1-Hour Limit (boluses/hr): 3  Basal Rate: 10        Assessment bilateral  Block outcome: pain improved and Pain score with contractions preprocedure 10/10; pain with contractions  postprocedure 3/10  Number of attempts: 2 (Bone encountered.)  Events: no positive test dose and No adverse events; Negative CSF/heme/paresthesias  Procedure assessment: patient tolerated procedure well with no immediate complications

## 2024-08-30 NOTE — PROGRESS NOTES
Intrapartum Progress Note    Subjective   Feeling lots of rectal pressure    Objective   Last Vitals:  Temp Pulse Resp BP MAP Pulse Ox   36.9 °C (98.4 °F) 87 18 137/67   99 %     Physical Examination:  , mod monica, +accels, no decels  [place]: q 2  VE: complete/0 station    Lab Review:  Lab Results   Component Value Date    WBC 9.6 2024    HGB 12.5 2024    HCT 36.4 2024     2024     Lab Results   Component Value Date    GLUCOSE 85 2024     (L) 2024    K 5.0 2024     2024    CO2 20 (L) 2024    ANIONGAP 13 2024    BUN 12 2024    CREATININE 0.67 2024    EGFR >90 2024    CALCIUM 8.7 2024    ALBUMIN 3.4 2024    PROT 5.4 (L) 2024    ALKPHOS 128 (H) 2024    ALT 20 2024    AST 39 2024    BILITOT 0.3 2024     0   Lab Value Date/Time    UTPCR 0.17 2024 1643     Assessment   Marisol Rebolledo is a 34 y.o.  at 37w4d  IOL, second stage  Fht cat 1  Ghtn, stable  Gbs pos    Plan   Continue vanco per protocol  Will sit on bed pan for 30m to get used to feeling of rectal pressure & pushing, then begin pushing with support  Anticipate

## 2024-08-30 NOTE — ANESTHESIA PREPROCEDURE EVALUATION
Patient: Marisol Rebolledo    Evaluation Method: In-person visit    Procedure Information    Date: 24  Procedure: Labor Epidural       34 year old  37w3d IOL gHTN. GERD, Sciatica, CTS with pregnancy.     H/o asthma, daily inhaler use. H/o hospitalization several years ago per patient, no intubations. H/o asthma exacerbation during this pregnancy, per patient.    Mild scoliosis, no intervention.    Desires labor epidural.    Patient denies personal or family h/o problems with GA.    Relevant Problems   Anesthesia (within normal limits)      Pulmonary   (+) Mild persistent asthma ([facility])      GI   (+) GERD (gastroesophageal reflux disease)      Musculoskeletal   (+) Dextroscoliosis   (+) TMJ syndrome      Circulatory   (+) Gestational hypertension, third trimester ([facility])      Gravid and    (+) Encounter for induction of labor ([facility])       Clinical information reviewed:   Tobacco  Allergies  Meds  Problems  Med Hx  Surg Hx   Fam Hx  Soc   Hx        NPO Detail:  NPO/Void Status  Date of Last Solid: 24  Time of Last Solid: 0  Last Intake Type: Food (Banana)         OB/Gyn Evaluation    Present Pregnancy    Patient is pregnant now.  (+) , hypertensive disorder of pregnancy - gestational hypertension   Obstetric History                Physical Exam    Airway  Mallampati: II  TM distance: >3 FB  Neck ROM: full     Cardiovascular - normal exam  Rhythm: regular  Rate: normal     Dental - normal exam     Pulmonary - normal exam  Breath sounds clear to auscultation     Abdominal - normal exam    Comments: gravid           Anesthesia Plan    History of general anesthesia?: yes  History of complications of general anesthesia?: no    ASA 2     epidural     The patient is not a current smoker.    Anesthetic plan and risks discussed with patient.  Use of blood products discussed with patient who consented to blood products.      I informed and discussed the risks and benefits of general,  spinal and epidural anesthesia with the patient.  The patient expressed her understanding and her questions were answered.  A verbal consent was given by the patient.

## 2024-08-31 ENCOUNTER — PHARMACY VISIT (OUTPATIENT)
Dept: PHARMACY | Facility: CLINIC | Age: 34
End: 2024-08-31
Payer: COMMERCIAL

## 2024-08-31 PROCEDURE — RXMED WILLOW AMBULATORY MEDICATION CHARGE

## 2024-08-31 PROCEDURE — 2500000001 HC RX 250 WO HCPCS SELF ADMINISTERED DRUGS (ALT 637 FOR MEDICARE OP): Performed by: ADVANCED PRACTICE MIDWIFE

## 2024-08-31 PROCEDURE — 1100000001 HC PRIVATE ROOM DAILY

## 2024-08-31 PROCEDURE — 2500000004 HC RX 250 GENERAL PHARMACY W/ HCPCS (ALT 636 FOR OP/ED): Performed by: OBSTETRICS & GYNECOLOGY

## 2024-08-31 PROCEDURE — 2500000001 HC RX 250 WO HCPCS SELF ADMINISTERED DRUGS (ALT 637 FOR MEDICARE OP): Performed by: OBSTETRICS & GYNECOLOGY

## 2024-08-31 PROCEDURE — 2500000002 HC RX 250 W HCPCS SELF ADMINISTERED DRUGS (ALT 637 FOR MEDICARE OP, ALT 636 FOR OP/ED): Performed by: ADVANCED PRACTICE MIDWIFE

## 2024-08-31 PROCEDURE — 2500000005 HC RX 250 GENERAL PHARMACY W/O HCPCS: Performed by: OBSTETRICS & GYNECOLOGY

## 2024-08-31 RX ORDER — ACETAMINOPHEN 500 MG
1000 TABLET ORAL EVERY 6 HOURS PRN
Qty: 60 TABLET | Refills: 0 | Status: SHIPPED | OUTPATIENT
Start: 2024-08-31 | End: 2024-09-04 | Stop reason: HOSPADM

## 2024-08-31 RX ORDER — IBUPROFEN 600 MG/1
600 TABLET ORAL EVERY 6 HOURS PRN
Qty: 30 TABLET | Refills: 0 | Status: SHIPPED | OUTPATIENT
Start: 2024-08-31 | End: 2024-09-04

## 2024-08-31 RX ORDER — DOCUSATE SODIUM 100 MG/1
100 CAPSULE, LIQUID FILLED ORAL 2 TIMES DAILY
Status: DISCONTINUED | OUTPATIENT
Start: 2024-08-31 | End: 2024-09-04 | Stop reason: HOSPADM

## 2024-08-31 RX ORDER — PROMETHAZINE HYDROCHLORIDE 25 MG/1
12.5 TABLET ORAL EVERY 6 HOURS PRN
Status: DISCONTINUED | OUTPATIENT
Start: 2024-08-31 | End: 2024-09-04 | Stop reason: HOSPADM

## 2024-08-31 SDOH — SOCIAL STABILITY: SOCIAL INSECURITY: HAVE YOU HAD ANY THOUGHTS OF HARMING ANYONE ELSE?: NO

## 2024-08-31 SDOH — HEALTH STABILITY: MENTAL HEALTH: WISH TO BE DEAD (PAST 1 MONTH): NO

## 2024-08-31 SDOH — SOCIAL STABILITY: SOCIAL INSECURITY: PHYSICAL ABUSE: DENIES

## 2024-08-31 SDOH — HEALTH STABILITY: MENTAL HEALTH: SUICIDAL BEHAVIOR (LIFETIME): NO

## 2024-08-31 SDOH — SOCIAL STABILITY: SOCIAL INSECURITY: VERBAL ABUSE: DENIES

## 2024-08-31 SDOH — HEALTH STABILITY: MENTAL HEALTH: WERE YOU ABLE TO COMPLETE ALL THE BEHAVIORAL HEALTH SCREENINGS?: YES

## 2024-08-31 SDOH — ECONOMIC STABILITY: HOUSING INSECURITY: DO YOU FEEL UNSAFE GOING BACK TO THE PLACE WHERE YOU ARE LIVING?: NO

## 2024-08-31 SDOH — SOCIAL STABILITY: SOCIAL INSECURITY: HAS ANYONE EVER THREATENED TO HURT YOUR FAMILY OR YOUR PETS?: NO

## 2024-08-31 SDOH — SOCIAL STABILITY: SOCIAL INSECURITY: ARE THERE ANY APPARENT SIGNS OF INJURIES/BEHAVIORS THAT COULD BE RELATED TO ABUSE/NEGLECT?: NO

## 2024-08-31 SDOH — SOCIAL STABILITY: SOCIAL INSECURITY: ARE YOU OR HAVE YOU BEEN THREATENED OR ABUSED PHYSICALLY, EMOTIONALLY, OR SEXUALLY BY ANYONE?: NO

## 2024-08-31 SDOH — SOCIAL STABILITY: SOCIAL INSECURITY: ABUSE SCREEN: ADULT

## 2024-08-31 SDOH — HEALTH STABILITY: MENTAL HEALTH: NON-SPECIFIC ACTIVE SUICIDAL THOUGHTS (PAST 1 MONTH): NO

## 2024-08-31 SDOH — SOCIAL STABILITY: SOCIAL INSECURITY: DO YOU FEEL ANYONE HAS EXPLOITED OR TAKEN ADVANTAGE OF YOU FINANCIALLY OR OF YOUR PERSONAL PROPERTY?: NO

## 2024-08-31 SDOH — SOCIAL STABILITY: SOCIAL INSECURITY: HAVE YOU HAD THOUGHTS OF HARMING ANYONE ELSE?: NO

## 2024-08-31 SDOH — SOCIAL STABILITY: SOCIAL INSECURITY: DOES ANYONE TRY TO KEEP YOU FROM HAVING/CONTACTING OTHER FRIENDS OR DOING THINGS OUTSIDE YOUR HOME?: NO

## 2024-08-31 ASSESSMENT — PAIN SCALES - GENERAL
PAINLEVEL_OUTOF10: 5 - MODERATE PAIN
PAINLEVEL_OUTOF10: 4
PAINLEVEL_OUTOF10: 4
PAINLEVEL_OUTOF10: 5 - MODERATE PAIN

## 2024-08-31 ASSESSMENT — PATIENT HEALTH QUESTIONNAIRE - PHQ9
SUM OF ALL RESPONSES TO PHQ9 QUESTIONS 1 & 2: 0
1. LITTLE INTEREST OR PLEASURE IN DOING THINGS: NOT AT ALL
2. FEELING DOWN, DEPRESSED OR HOPELESS: NOT AT ALL

## 2024-08-31 ASSESSMENT — LIFESTYLE VARIABLES
AUDIT-C TOTAL SCORE: 0
AUDIT-C TOTAL SCORE: 0
HOW OFTEN DO YOU HAVE 6 OR MORE DRINKS ON ONE OCCASION: NEVER
SKIP TO QUESTIONS 9-10: 1
HOW MANY STANDARD DRINKS CONTAINING ALCOHOL DO YOU HAVE ON A TYPICAL DAY: PATIENT DOES NOT DRINK
HOW OFTEN DO YOU HAVE A DRINK CONTAINING ALCOHOL: NEVER

## 2024-08-31 ASSESSMENT — ACTIVITIES OF DAILY LIVING (ADL): LACK_OF_TRANSPORTATION: NO

## 2024-08-31 NOTE — ANESTHESIA POSTPROCEDURE EVALUATION
Patient: Marisol Rebolledo    Procedure Summary       Date: 08/30/24 Room / Location:     Anesthesia Start: 0445 Anesthesia Stop: 1158    Procedure: Labor Analgesia Diagnosis:     Scheduled Providers:  Responsible Provider: MYRON Morley    Anesthesia Type: epidural ASA Status: 2            Anesthesia Type: epidural    Vitals:    08/30/24 1609   BP: 122/73   Pulse: 96   Resp: 16   Temp: 36.9 °C (98.4 °F)   SpO2: 100%         Anesthesia Post Evaluation    Patient location during evaluation: bedside  Patient participation: complete - patient participated  Level of consciousness: awake and alert  Pain score: 4  Pain management: adequate  Airway patency: patent  Cardiovascular status: acceptable  Respiratory status: acceptable  Hydration status: acceptable  Postoperative Nausea and Vomiting: none        No notable events documented.

## 2024-08-31 NOTE — LACTATION NOTE
Lactation Consultant Note    Met with mom to discuss breastfeeding. Mom stated baby will latch but only offers a few sucks each time. Mom is attempting with the nipple shield due to her flat nipple anatomy. Reviewed tips to malcom the nipples prior to latching. Baby was very fussy during latching attempt. Baby did latch to bare nipple and offer a couple of sucks. Baby then latched to nipple shield and offered a longer suck burst with some occasional sucks after that. The sucks the baby offered were chomping type sucks and not the long jaw rhythmic movement that shows nutritive sucking. On assessment, baby may have some oral restrictions. Baby retracts tongue behind the gum line during suck assessment. Finger suck training demonstrated and instructed parents to try finger suck training prior to latch attempts. Since baby is passing the 24 hour maria elena this morning, instructed mom to follow the following feeding plan.    Attempt at the breast at early hunger cues. If baby was able to achieve a nutritive sucking pattern for at least 10 minutes and appears satisified, no need to do the following steps.  Pump both breasts for 15 minutes and syringe feed colostrum or pace bottle feed colostrum  Refer to supplemental guideline and offer formula via syringe or paced bottle feeding as needed. Recommended for the 24-48 hour timeframe is 5-15 mL, for 48-72 hours 15-30 mL is recommended per feeding.

## 2024-08-31 NOTE — PROGRESS NOTES
Postpartum Progress Note:    Subjective   34 y.o.  on day 1 postpartum  Feels well & happy, but sore.  Breastfeeding, but baby sometimes sleepy. Has not seen LC yet   Pain: using tylenol & motrin  Perineum: using ice/tucks  + void & flatus    Objective    Last Vitals  Temp Pulse Resp BP MAP O2 Sat   36.6 °C (97.9 °F) 60 16 (!) 135/91   98 %     breasts soft, nontender, no s/s  nipple trauma  abdomen non-distended  fundus firm, u/1, slightly displaced to right  Perineum well approximated. Edema still present but less than yesterday  Lochia moderate  B/p's normal to moderate    Assessment/Plan   Marisol Rebolledo is a 34 y.o., , who delivered at 37w4d gestation and is now postpartum day 1.  Ghtn, stable  Continue routine postpartum care  Pain well controlled on PO medications & with using other comfort measures  Patient has been assessed to have a DVT risk score of 5, prophylactic lovenox indicated  Patient is Rh Positive (Rh+).  Encouraged breastfeeding & lactation consult   Contraception methods discussed, plans to discuss with provider in office  Bonding well with baby  Normal recovery & involution to date. Appropriate for discharge on PPD #3 if remains stable    ADRIANA Nails-CORRINE

## 2024-09-01 VITALS
DIASTOLIC BLOOD PRESSURE: 97 MMHG | BODY MASS INDEX: 33.77 KG/M2 | WEIGHT: 235.89 LBS | RESPIRATION RATE: 18 BRPM | HEIGHT: 70 IN | HEART RATE: 66 BPM | OXYGEN SATURATION: 98 % | TEMPERATURE: 99.3 F | SYSTOLIC BLOOD PRESSURE: 154 MMHG

## 2024-09-01 LAB
ABO GROUP (TYPE) IN BLOOD: NORMAL
ALBUMIN SERPL BCP-MCNC: 2.9 G/DL (ref 3.4–5)
ALP SERPL-CCNC: 92 U/L (ref 33–110)
ALT SERPL W P-5'-P-CCNC: 22 U/L (ref 7–45)
ANION GAP SERPL CALC-SCNC: 13 MMOL/L (ref 10–20)
ANTIBODY SCREEN: NORMAL
APPEARANCE UR: ABNORMAL
AST SERPL W P-5'-P-CCNC: 20 U/L (ref 9–39)
BACTERIA #/AREA URNS AUTO: ABNORMAL /HPF
BILIRUB SERPL-MCNC: 0.2 MG/DL (ref 0–1.2)
BILIRUB UR STRIP.AUTO-MCNC: NEGATIVE MG/DL
BUN SERPL-MCNC: 8 MG/DL (ref 6–23)
CALCIUM SERPL-MCNC: 8.1 MG/DL (ref 8.6–10.3)
CHLORIDE SERPL-SCNC: 106 MMOL/L (ref 98–107)
CO2 SERPL-SCNC: 22 MMOL/L (ref 21–32)
COLOR UR: ABNORMAL
CREAT SERPL-MCNC: 0.74 MG/DL (ref 0.5–1.05)
EGFRCR SERPLBLD CKD-EPI 2021: >90 ML/MIN/1.73M*2
ERYTHROCYTE [DISTWIDTH] IN BLOOD BY AUTOMATED COUNT: 13.8 % (ref 11.5–14.5)
GLUCOSE SERPL-MCNC: 93 MG/DL (ref 74–99)
GLUCOSE UR STRIP.AUTO-MCNC: NORMAL MG/DL
HCT VFR BLD AUTO: 30.7 % (ref 36–46)
HGB BLD-MCNC: 10.3 G/DL (ref 12–16)
KETONES UR STRIP.AUTO-MCNC: NEGATIVE MG/DL
LDH SERPL L TO P-CCNC: 262 U/L (ref 84–246)
LEUKOCYTE ESTERASE UR QL STRIP.AUTO: ABNORMAL
MCH RBC QN AUTO: 29 PG (ref 26–34)
MCHC RBC AUTO-ENTMCNC: 33.6 G/DL (ref 32–36)
MCV RBC AUTO: 87 FL (ref 80–100)
MUCOUS THREADS #/AREA URNS AUTO: ABNORMAL /LPF
NITRITE UR QL STRIP.AUTO: NEGATIVE
NRBC BLD-RTO: 0 /100 WBCS (ref 0–0)
PH UR STRIP.AUTO: 6 [PH]
PLATELET # BLD AUTO: 144 X10*3/UL (ref 150–450)
POTASSIUM SERPL-SCNC: 4.1 MMOL/L (ref 3.5–5.3)
PROT SERPL-MCNC: 4.8 G/DL (ref 6.4–8.2)
PROT UR STRIP.AUTO-MCNC: ABNORMAL MG/DL
RBC # BLD AUTO: 3.55 X10*6/UL (ref 4–5.2)
RBC # UR STRIP.AUTO: ABNORMAL /UL
RBC #/AREA URNS AUTO: >20 /HPF
RH FACTOR (ANTIGEN D): NORMAL
SODIUM SERPL-SCNC: 137 MMOL/L (ref 136–145)
SP GR UR STRIP.AUTO: 1
SQUAMOUS #/AREA URNS AUTO: ABNORMAL /HPF
UROBILINOGEN UR STRIP.AUTO-MCNC: NORMAL MG/DL
WBC # BLD AUTO: 13.2 X10*3/UL (ref 4.4–11.3)
WBC #/AREA URNS AUTO: >50 /HPF

## 2024-09-01 PROCEDURE — 2500000001 HC RX 250 WO HCPCS SELF ADMINISTERED DRUGS (ALT 637 FOR MEDICARE OP): Performed by: OBSTETRICS & GYNECOLOGY

## 2024-09-01 PROCEDURE — 86901 BLOOD TYPING SEROLOGIC RH(D): CPT | Performed by: OBSTETRICS & GYNECOLOGY

## 2024-09-01 PROCEDURE — 80053 COMPREHEN METABOLIC PANEL: CPT | Performed by: ADVANCED PRACTICE MIDWIFE

## 2024-09-01 PROCEDURE — 36415 COLL VENOUS BLD VENIPUNCTURE: CPT | Performed by: OBSTETRICS & GYNECOLOGY

## 2024-09-01 PROCEDURE — 81001 URINALYSIS AUTO W/SCOPE: CPT | Performed by: ADVANCED PRACTICE MIDWIFE

## 2024-09-01 PROCEDURE — 85027 COMPLETE CBC AUTOMATED: CPT | Performed by: ADVANCED PRACTICE MIDWIFE

## 2024-09-01 PROCEDURE — 2500000005 HC RX 250 GENERAL PHARMACY W/O HCPCS: Performed by: OBSTETRICS & GYNECOLOGY

## 2024-09-01 PROCEDURE — 2500000001 HC RX 250 WO HCPCS SELF ADMINISTERED DRUGS (ALT 637 FOR MEDICARE OP): Performed by: ADVANCED PRACTICE MIDWIFE

## 2024-09-01 PROCEDURE — 83615 LACTATE (LD) (LDH) ENZYME: CPT | Performed by: ADVANCED PRACTICE MIDWIFE

## 2024-09-01 PROCEDURE — 1100000001 HC PRIVATE ROOM DAILY

## 2024-09-01 RX ORDER — ACETAMINOPHEN 325 MG/1
975 TABLET ORAL EVERY 6 HOURS SCHEDULED
Qty: 168 TABLET | Refills: 0 | Status: CANCELLED | OUTPATIENT
Start: 2024-09-02 | End: 2024-09-16

## 2024-09-01 ASSESSMENT — PAIN DESCRIPTION - LOCATION: LOCATION: ABDOMEN

## 2024-09-01 ASSESSMENT — PAIN SCALES - GENERAL
PAINLEVEL_OUTOF10: 3
PAINLEVEL_OUTOF10: 5 - MODERATE PAIN
PAINLEVEL_OUTOF10: 3
PAINLEVEL_OUTOF10: 4
PAINLEVEL_OUTOF10: 4
PAINLEVEL_OUTOF10: 1
PAINLEVEL_OUTOF10: 3
PAINLEVEL_OUTOF10: 2

## 2024-09-01 ASSESSMENT — PAIN DESCRIPTION - ORIENTATION: ORIENTATION: RIGHT;UPPER

## 2024-09-01 NOTE — PROGRESS NOTES
Postpartum Progress Note:    Subjective   34 y.o.  on day PPD2 postpartum/postop  Feels well & happy  Feeding breast  Pain: right sided pain  Perineum: well controlled  Void/flatus/bm    Objective    Last Vitals  Temp Pulse Resp BP MAP O2 Sat   36.3 °C (97.3 °F) 73 18 139/88   98 %     ALEXIS Araiza        Assessment/Plan   Marisol Rebolledo is a 34 y.o., , who delivered at 37w4d gestation and is now postpartum day 2.  She is endorsing right sided pain. Not flank pain or epigastric, but rather pain on her right side between her hip and her lower rib cage.  Neg CVA tenderness, neg murphys on assessment. Highly suspect musculoskeletal in origin, as she indicates she pushed on her right side for an extended period of time.   She also is concerned she has preeclampsia and that she may be dying, since her friend almost  of preeclampsia.     Plan for lidocaine patch and K pad  Cmp, cbc, and LDH ordered. UA and urine culture ordered for reassurance.   Reassurance provided  Discussed possibly starting medication for anxiety, and she declined  Continue routine postpartum care  Pain well controlled on PO medications  Patient has been assessed to have a DVT risk score of 5 , prophylactic lovenox indicated  Patient is Rh Positive (Rh+)., baby is Rh Positive (Rh+).; Not eligible  Encouraged breastfeeding, lactation consult as needed  Contraception methods discussed, plans   Bonding well with baby  Normal recovery & involution to date. Appropriate for discharge on PPD #2 if remains stable  Advised patient to follow up in 4-6 weeks with primary OB provider for routine postpartum visit    ALEXIS Araiza

## 2024-09-01 NOTE — CARE PLAN
The patient's goals for the shift include breastfeeding    The clinical goals for the shift include will rate pain under 5/10    Over the shift, the patient did not make progress toward the following goals. Barriers to progression include none. Recommendations to address these barriers include none.

## 2024-09-02 PROCEDURE — 2500000001 HC RX 250 WO HCPCS SELF ADMINISTERED DRUGS (ALT 637 FOR MEDICARE OP): Performed by: STUDENT IN AN ORGANIZED HEALTH CARE EDUCATION/TRAINING PROGRAM

## 2024-09-02 PROCEDURE — 2500000004 HC RX 250 GENERAL PHARMACY W/ HCPCS (ALT 636 FOR OP/ED): Performed by: OBSTETRICS & GYNECOLOGY

## 2024-09-02 PROCEDURE — 2500000001 HC RX 250 WO HCPCS SELF ADMINISTERED DRUGS (ALT 637 FOR MEDICARE OP): Performed by: OBSTETRICS & GYNECOLOGY

## 2024-09-02 PROCEDURE — 2500000002 HC RX 250 W HCPCS SELF ADMINISTERED DRUGS (ALT 637 FOR MEDICARE OP, ALT 636 FOR OP/ED): Performed by: MIDWIFE

## 2024-09-02 PROCEDURE — 1100000001 HC PRIVATE ROOM DAILY

## 2024-09-02 RX ORDER — NIFEDIPINE 30 MG/1
30 TABLET, FILM COATED, EXTENDED RELEASE ORAL ONCE
Status: COMPLETED | OUTPATIENT
Start: 2024-09-02 | End: 2024-09-02

## 2024-09-02 RX ORDER — NIFEDIPINE 30 MG/1
60 TABLET, FILM COATED, EXTENDED RELEASE ORAL
Status: DISCONTINUED | OUTPATIENT
Start: 2024-09-03 | End: 2024-09-03

## 2024-09-02 RX ORDER — NIFEDIPINE 30 MG/1
30 TABLET, FILM COATED, EXTENDED RELEASE ORAL
Status: DISCONTINUED | OUTPATIENT
Start: 2024-09-02 | End: 2024-09-02

## 2024-09-02 RX ORDER — HYDROXYZINE HYDROCHLORIDE 25 MG/1
25 TABLET, FILM COATED ORAL EVERY 8 HOURS PRN
Status: DISCONTINUED | OUTPATIENT
Start: 2024-09-02 | End: 2024-09-04 | Stop reason: HOSPADM

## 2024-09-02 ASSESSMENT — PAIN SCALES - GENERAL
PAINLEVEL_OUTOF10: 3
PAINLEVEL_OUTOF10: 7
PAINLEVEL_OUTOF10: 3
PAINLEVEL_OUTOF10: 7
PAINLEVEL_OUTOF10: 3
PAINLEVEL_OUTOF10: 1

## 2024-09-02 ASSESSMENT — PAIN SCALES - PAIN ASSESSMENT IN ADVANCED DEMENTIA (PAINAD): TOTALSCORE: MEDICATION (SEE MAR)

## 2024-09-02 ASSESSMENT — PAIN DESCRIPTION - LOCATION: LOCATION: HEAD

## 2024-09-02 NOTE — SIGNIFICANT EVENT
BP remains elevated 159/89 with last set of vitals     Spoke with OB attending Dr. Cespedes, will give additional dose of nifedipine 30mg now and begin 60mg tomorrow AM     JIMBO ESPINOZA

## 2024-09-02 NOTE — LACTATION NOTE
Lactation Consultant Note  Lactation Consultation  Reason for Consult: Follow-up assessment  Consultant Name: Patria Kwon    Maternal Information  Has mother  before?: No  Infant to breast within first 2 hours of birth?: Yes  Exclusive Pump and Bottle Feed: No    Maternal Assessment  Breast Assessment: Medium, Compressible, Soft  Nipple Assessment: Intact, Flat, Erect with stimulation  Areola Assessment: Normal    Infant Assessment  Infant Behavior: Quiet alert, Readiness to feed  Infant Assessment: Palate - high/arch/bubble/normal, Good cupping of tongue (suspected posterior tight frenulum. Baby retracts tongue during suck assessment and mom complains of pinching pain when baby latches)    Feeding Assessment  Nutrition Source: Breastmilk  Feeding Method: Nursing at the breast, Feeding expressed breastmilk, Paced bottle  Feeding Position: Cross - cradle, Skin to skin, Nipple to nose, Chin tucked into breast, One sided nursing, Nose lightly touching breast  Suck/Feeding: Sustained, Nipple shield used, Coordinated suck/swallow/breathe, Cheeks dimple during sucking (cheek dimpled during sucking. Used nipple shield and baby was no longer dimpling.)  Latch Assessment: Lower lip turned in, Upper lip turned in, Latch is painful, Moderate assistance is needed, Sucks with long jaw movement, Chin moves in rhythmic motion, Wide open mouth < 160, Deep latch obtained    LATCH TOOL  Latch: Repeated attempts, hold nipple in mouth, stimulate to suck  Audible Swallowing: Spontaneous and intermittent (24 hours old)  Type of Nipple: Everted (After stimulation)  Comfort (Breast/Nipple): Soft/non-tender  Hold (Positioning): Minimal assist, teach one side, mother does other, staff holds  LATCH Score: 8    Breast Pump  Pump: Hospital grade electric pump  Frequency: 8-10 times per day  Duration: 15-20 minutes per session  Breast Shield Size and Type: 21 mm  Volume of Milk Production: 58  Units of Volume: mL per  session    Other OB Lactation Tools  Lactation Tools: Nipple shields, Lanolin, Flanges    Patient Follow-up  Inpatient Lactation Follow-up Needed : Yes  Outpatient Lactation Follow-up: Recommended  Lactation Professional - OK to Discharge: Yes    Other OB Lactation Documentation  Maternal Risk Factors: Age over 30, primiparity  Infant Risk Factors: Early term birth 37-39 weeks    Recommendations/Summary  Assisted mom with latching in cross cradle. Mom attempted to malcom nipple with hand pump prior to latching. Baby was unable to achieve a deep latch without the nipple shield. Baby's cheek was dimpling when on the bare breast for the first attempt. Once baby latched to nipple shield, baby was able to achieve audible swallows when tactile stimulated or when the breast was compressed. After ten minutes, baby became sleepy so we took baby off, removed the nipple shield and re latched the baby to bare breast and baby was able to achieve a deep latch with nutritive sucking pattern. Mom's breasts are beginning to fill. Observed mom pump after breastfeeding and mom was able to pump 58 mL. Instructed parents still continue with feeding plan and offer expressed breast milk after breast feedings due to concerns for hyper bilirubin and due to the lack of bowel movements from baby. Instructed the parents to plan to give 30-60 mL per feeding after breastfeeds. Parents verbalized understanding. Reviewed pump settings now that the milk is in, breast milk storage guidelines, and engorgement care.

## 2024-09-02 NOTE — CARE PLAN
The patient's goals for the shift include improved BP  Improve infant latch    Problem: Hypertensive Disorder of Pregnancy (HDP)  Goal: Minimal s/sx of HDP and BP<160/110  Outcome: Progressing

## 2024-09-02 NOTE — PROGRESS NOTES
Postpartum Progress Note    Assessment/Plan   Marisol Rebolledo is a 34 y.o., , who delivered at 37w4d gestation and is now postpartum day 3.    Gestational hypertension, new order for nifedipine active now     Assessment & Plan  Pregnancy with 37 weeks completed gestation ([facility])    Gestational hypertension, third trimester ([facility])    Encounter for induction of labor ([facility])    Pregnancy Problems (from 24 to present)       Problem Noted Resolved    Gestational hypertension, third trimester ([facility]) 2024 by Rosalinda Washington MD No    Priority:  Medium      Overview Signed 2024 11:19 AM by Rosalinda Washington MD     Mild range BP elevation is noted at 37 weeks. This is documented in office and home BP log. She denies s/s of preeclampsia but notes worsening edema.   She will present to labor and delivery for evaluation and likely delivery.         Pregnancy with 37 weeks completed gestation ([facility]) 2024 by Sandy Cespedes, DO No    Priority:  Medium      Encounter for induction of labor ([facility]) 2024 by Sandy Cespedes, DO No    Priority:  Medium      Group B Streptococcus carrier state affecting pregnancy ([facility]) 2024 by Rosalinda Washington MD No    Priority:  Medium      Overview Addendum 2024 12:03 PM by Rosalinda Washington MD     Patient has multiple allergies to various antibiotics. GBBS is resistant to clindamycin. Plan vancomycin while in labor.          37 weeks gestation of pregnancy ([facility]) 2024 by ALEXIS Garcia, APRN-CNP No    Priority:  Medium      Primigravida, third trimester ([facility]) 2024 by ADRIANA Garcia-CORRINE, APRN-CNP No    Priority:  Medium      Maternal varicella, non-immune ([facility]) 2024 by ALEXIS Garcia, APRN-CNP No    Priority:  Medium      Overview Signed 2024  8:43 PM by ALEXIS Garcia, APRN-CNP     Vaccinate PP               Hospital course: gestational  hypertension  Forcep assisted vaginal delivery   Gestational hypertension with 150s systolic, nifedipine started 9/2/24 AM   Pumping and feeding expressed breast milk     Subjective   Her pain is moderately controlled with current medications  She is passing flatus  She is ambulating well  She is tolerating a Adult diet Regular  She reports poor infant latch and potential flat nipples and/or NB frenulum, using lactation services  She  is feeling anxious related to blood pressure concerns and new status with baby, we will continue to monitor coping.     Her plan for contraception is none     Discussed BPS and indication for medication, pt's questions were answered to her satisfaction and she is agreeable to starting this today. She has not yet been able to have BM but is taking Colace. She is anxious and at times tearful during conversation, but overall coping okay and  is supportive at bedside.     Reviewed Bps with OB attending Dr. Cespedes, agreeable to starting nifedpine 30mg now     Objective   Allergies:   Amoxicillin, Azithromycin, Cefdinir, Doxycycline, Levofloxacin, Nystatin, Sulfa (sulfonamide antibiotics), and Prednisone         Last Vitals:  Temp Pulse Resp BP MAP Pulse Ox   36.6 °C (97.9 °F) 88 18 (!) 154/99 112 98 %     Vitals Min/Max Last 24 Hours:  Temp  Min: 36.6 °C (97.9 °F)  Max: 37.4 °C (99.3 °F)  Pulse  Min: 66  Max: 88  Resp  Min: 18  Max: 18  BP  Min: 143/88  Max: 154/99  MAP (mmHg)  Min: 97  Max: 118    Intake/Output:   No intake or output data in the 24 hours ending 09/02/24 0807    Physical Exam:  General: Examination reveals a well developed, well nourished, female, in no acute distress. She is alert and cooperative.  HEENT: PERRLA. External ears normal. Nose normal, no erythema or discharge. Mouth and throat clear.  Neck: supple, no significant adenopathy.  Lungs: clear to auscultation bilaterally.  Cardiac: regular rate and rhythm, S1, S2 normal, no murmur, click, rub or  gallop.  Breasts: lactating, no erythema or tenderness, nipples normal.  Abdomen: soft, non-tender, non-distended, no palpable masses, +BS.  Fundus: firm and 1FW below umbilicus.  Perineum: swollen but intact and healing well.  Extremities: no redness or tenderness in the calves or thighs, edema 2+ BLE+.  Neurological: alert, oriented, normal speech, no focal findings or movement disorder noted.  Psychological: awake and alert; oriented to person, place, and time.    Lab Data:  Labs in chart were reviewed.    JIMBO ESPINOZA

## 2024-09-03 ENCOUNTER — APPOINTMENT (OUTPATIENT)
Dept: OBSTETRICS AND GYNECOLOGY | Facility: CLINIC | Age: 34
End: 2024-09-03
Payer: COMMERCIAL

## 2024-09-03 LAB
ALBUMIN SERPL BCP-MCNC: 3.1 G/DL (ref 3.4–5)
ALP SERPL-CCNC: 91 U/L (ref 33–110)
ALT SERPL W P-5'-P-CCNC: 63 U/L (ref 7–45)
ANION GAP SERPL CALC-SCNC: 14 MMOL/L (ref 10–20)
AST SERPL W P-5'-P-CCNC: 58 U/L (ref 9–39)
BILIRUB SERPL-MCNC: 0.3 MG/DL (ref 0–1.2)
BUN SERPL-MCNC: 9 MG/DL (ref 6–23)
CALCIUM SERPL-MCNC: 8 MG/DL (ref 8.6–10.3)
CHLORIDE SERPL-SCNC: 105 MMOL/L (ref 98–107)
CO2 SERPL-SCNC: 24 MMOL/L (ref 21–32)
CREAT SERPL-MCNC: 0.63 MG/DL (ref 0.5–1.05)
EGFRCR SERPLBLD CKD-EPI 2021: >90 ML/MIN/1.73M*2
ERYTHROCYTE [DISTWIDTH] IN BLOOD BY AUTOMATED COUNT: 13.5 % (ref 11.5–14.5)
GLUCOSE SERPL-MCNC: 81 MG/DL (ref 74–99)
HCT VFR BLD AUTO: 31.1 % (ref 36–46)
HGB BLD-MCNC: 10.6 G/DL (ref 12–16)
LDH SERPL L TO P-CCNC: 270 U/L (ref 84–246)
MCH RBC QN AUTO: 29.8 PG (ref 26–34)
MCHC RBC AUTO-ENTMCNC: 34.1 G/DL (ref 32–36)
MCV RBC AUTO: 87 FL (ref 80–100)
NRBC BLD-RTO: 0 /100 WBCS (ref 0–0)
PLATELET # BLD AUTO: 171 X10*3/UL (ref 150–450)
POTASSIUM SERPL-SCNC: 3.8 MMOL/L (ref 3.5–5.3)
PROT SERPL-MCNC: 5.2 G/DL (ref 6.4–8.2)
RBC # BLD AUTO: 3.56 X10*6/UL (ref 4–5.2)
SODIUM SERPL-SCNC: 139 MMOL/L (ref 136–145)
URATE SERPL-MCNC: 4.3 MG/DL (ref 2.3–6.7)
WBC # BLD AUTO: 9.1 X10*3/UL (ref 4.4–11.3)

## 2024-09-03 PROCEDURE — 84550 ASSAY OF BLOOD/URIC ACID: CPT | Performed by: STUDENT IN AN ORGANIZED HEALTH CARE EDUCATION/TRAINING PROGRAM

## 2024-09-03 PROCEDURE — 1100000001 HC PRIVATE ROOM DAILY

## 2024-09-03 PROCEDURE — 2500000005 HC RX 250 GENERAL PHARMACY W/O HCPCS: Performed by: OBSTETRICS & GYNECOLOGY

## 2024-09-03 PROCEDURE — 36415 COLL VENOUS BLD VENIPUNCTURE: CPT | Performed by: STUDENT IN AN ORGANIZED HEALTH CARE EDUCATION/TRAINING PROGRAM

## 2024-09-03 PROCEDURE — 2500000004 HC RX 250 GENERAL PHARMACY W/ HCPCS (ALT 636 FOR OP/ED): Performed by: OBSTETRICS & GYNECOLOGY

## 2024-09-03 PROCEDURE — 2500000001 HC RX 250 WO HCPCS SELF ADMINISTERED DRUGS (ALT 637 FOR MEDICARE OP): Performed by: OBSTETRICS & GYNECOLOGY

## 2024-09-03 PROCEDURE — 2500000002 HC RX 250 W HCPCS SELF ADMINISTERED DRUGS (ALT 637 FOR MEDICARE OP, ALT 636 FOR OP/ED): Performed by: STUDENT IN AN ORGANIZED HEALTH CARE EDUCATION/TRAINING PROGRAM

## 2024-09-03 PROCEDURE — 80053 COMPREHEN METABOLIC PANEL: CPT | Performed by: STUDENT IN AN ORGANIZED HEALTH CARE EDUCATION/TRAINING PROGRAM

## 2024-09-03 PROCEDURE — 85027 COMPLETE CBC AUTOMATED: CPT | Performed by: STUDENT IN AN ORGANIZED HEALTH CARE EDUCATION/TRAINING PROGRAM

## 2024-09-03 PROCEDURE — 2500000002 HC RX 250 W HCPCS SELF ADMINISTERED DRUGS (ALT 637 FOR MEDICARE OP, ALT 636 FOR OP/ED): Performed by: OBSTETRICS & GYNECOLOGY

## 2024-09-03 PROCEDURE — 83615 LACTATE (LD) (LDH) ENZYME: CPT | Performed by: STUDENT IN AN ORGANIZED HEALTH CARE EDUCATION/TRAINING PROGRAM

## 2024-09-03 RX ORDER — METOCLOPRAMIDE 10 MG/1
10 TABLET ORAL ONCE
Status: COMPLETED | OUTPATIENT
Start: 2024-09-03 | End: 2024-09-03

## 2024-09-03 RX ORDER — NIFEDIPINE 30 MG/1
30 TABLET, FILM COATED, EXTENDED RELEASE ORAL NIGHTLY
Status: DISCONTINUED | OUTPATIENT
Start: 2024-09-03 | End: 2024-09-04

## 2024-09-03 RX ORDER — NIFEDIPINE 30 MG/1
90 TABLET, FILM COATED, EXTENDED RELEASE ORAL
Status: DISCONTINUED | OUTPATIENT
Start: 2024-09-04 | End: 2024-09-03

## 2024-09-03 RX ORDER — NIFEDIPINE 30 MG/1
30 TABLET, FILM COATED, EXTENDED RELEASE ORAL NIGHTLY
Status: DISCONTINUED | OUTPATIENT
Start: 2024-09-03 | End: 2024-09-03

## 2024-09-03 RX ORDER — NIFEDIPINE 30 MG/1
90 TABLET, FILM COATED, EXTENDED RELEASE ORAL
Status: DISCONTINUED | OUTPATIENT
Start: 2024-09-03 | End: 2024-09-03

## 2024-09-03 RX ORDER — IBUPROFEN 200 MG
200 TABLET ORAL ONCE
Status: COMPLETED | OUTPATIENT
Start: 2024-09-03 | End: 2024-09-03

## 2024-09-03 RX ORDER — NIFEDIPINE 30 MG/1
60 TABLET, FILM COATED, EXTENDED RELEASE ORAL
Status: DISCONTINUED | OUTPATIENT
Start: 2024-09-04 | End: 2024-09-04

## 2024-09-03 RX ORDER — NIFEDIPINE 30 MG/1
90 TABLET, FILM COATED, EXTENDED RELEASE ORAL ONCE
Status: COMPLETED | OUTPATIENT
Start: 2024-09-03 | End: 2024-09-03

## 2024-09-03 ASSESSMENT — PAIN SCALES - GENERAL
PAINLEVEL_OUTOF10: 2
PAINLEVEL_OUTOF10: 8
PAINLEVEL_OUTOF10: 4
PAINLEVEL_OUTOF10: 10 - WORST POSSIBLE PAIN

## 2024-09-03 NOTE — SIGNIFICANT EVENT
"Called to evaluate patient for severe headache.    She received nifedipine 90mg at 0400.  Around 8am she felt flushed and had a pounding headache that has not improved.  She has  not eaten anything this morning.  Marisol reports that she had an argument with her  overnight and he left the hospital this morning.  He is upset that she is still in the hospital and feels that this is her fault and she is causing her BP to be elevated.  He has asked her not to push her call button to ask for anything, specifically this morning when she noticed these symptoms.  He was upset that she slept overnight and was not able to help take care of the baby.  He has told her that she is selfish because her food is taking up space in the patient refrigerator.    Visit Vitals  BP (!) 149/94   Pulse 97   Temp 37.4 °C (99.3 °F) (Temporal)   Resp 16   Ht 1.778 m (5' 10\")   Wt 107 kg (235 lb 14.3 oz)   LMP 12/11/2023 (Exact Date)   SpO2 98%   Breastfeeding Yes   BMI 33.85 kg/m²   OB Status Recent pregnancy   Smoking Status Never   BSA 2.3 m²      Labs from this am reviewed, AST/ALT elevated.    Long discussion with patient regarding gestational hypertension and importance of close monitoring and that she is not causing this.    Will treat headache with additional dose of motrin 200mg, reglan 10mg and caffeine and reassess.   asked to see patient.      "

## 2024-09-03 NOTE — ASSESSMENT & PLAN NOTE
Repeat HELLP labs ordered.  Exam is benign.  Her headache has improved with sleep, no vision changes. Consider Mag if she develops a worsening HA concerning for symptomatic Pre-E.  Nifedipine increased to 90 mg qDay.

## 2024-09-03 NOTE — CARE PLAN
The patient's goals for the shift include infant care/bonding/discharge home    The clinical goals for the shift include free from injury      Problem: Postpartum  Goal: Experiences normal postpartum course  Outcome: Progressing  Goal: Appropriate maternal -  bonding  Outcome: Progressing  Goal: Establish and maintain infant feeding pattern for adequate nutrition  Outcome: Progressing  Goal: Incisions, wounds, or drain sites healing without S/S of infection  Outcome: Progressing  Goal: No s/sx infection  Outcome: Progressing  Goal: No s/sx of hemorrhage  Outcome: Progressing  Goal: Minimal s/sx of HDP and BP<160/110  Outcome: Progressing     Problem: Hypertensive Disorder of Pregnancy (HDP)  Goal: Minimal s/sx of HDP and BP<160/110  Outcome: Progressing  Goal: Adequate urine output (0.5 ml/kg/hr)  Outcome: Progressing     Problem: Nausea/Vomiting  Goal: Adequate urine output (0.5 ml/kg/hr)  Outcome: Progressing  Goal: Free from nausea/vomiting  Outcome: Progressing  Goal: Tolerates prescribed diet  Outcome: Progressing  Goal: Weight maintenance or gain  Outcome: Progressing  Goal: Achieve/maintain normal electrolyte level  Outcome: Progressing     Problem: Postpartum hemorrhage  Goal: Hemodynamic stability and limit blood loss  Outcome: Progressing     Problem: Pain - Adult  Goal: Verbalizes/displays adequate comfort level or baseline comfort level  Outcome: Progressing     Problem: Safety - Adult  Goal: Free from fall injury  Outcome: Progressing     Problem: Discharge Planning  Goal: Discharge to home or other facility with appropriate resources  Outcome: Progressing

## 2024-09-03 NOTE — PROGRESS NOTES
Social Work Brief Note     Patient: Marisol Rebolledo  Supply Name: Samson Rebolledo   : 26      Reason for Visit: Support      met with parents bedside for a support visit. Mrs. Rebolledo said she is not feeling well this morning as her BP has been high and she has a headache. She has been trying to relax and rest this morning. SW listened and provided support to her. Her spouse is bedside and feeding baby. Parents state that they both work and Mrs. Rebolledo will be on maternity leave. Mr. Rebolledo is feeling ready to go home since he only has a week left of paternity leave. SW validated his feelings but reminded him that she is still here because she has not been medically cleared for discharge. His employer said to contact them once he gets home so SW spoke to Mr. Reoblledo about extending his paternity leave a few more days if they are at the hospital longer then expected. He seemed agreeable to that. Mrs. Rebolledo states that once he goes back to work she will be home alone with the  but does have supportive family in the area. She will be on maternity leave until early January.     Parents had visitors come in to the room during the SW visit so conversation was stopped. SW will revisit parents as schedule allows for today or tomorrow if they are still here. Nurse updated.       Signature: DONA Bettencourt

## 2024-09-03 NOTE — SIGNIFICANT EVENT
Headache improved.    Will continue to monitor inpatient until tomorrow to titrate medications.  Given significant headache after 90mg, will change dosing to:  Nifedipine 60mg qam  Nifedipine 30mg at bedtime  Repeat CMP tomorrow given bump in LFTs

## 2024-09-03 NOTE — PROGRESS NOTES
Postpartum Progress Note    Assessment/Plan   Marisol Rebolledo is a 34 y.o., , who delivered at 37w4d gestation and is now postpartum day 4.    Patient has gestational hypertension, seen for persistent mild ranges that are borderline to severe.    Assessment & Plan  Pregnancy with 37 weeks completed gestation ([facility])    Gestational hypertension, third trimester ([facility])  Repeat HELLP labs ordered.  Exam is benign.  Her headache has improved with sleep, no vision changes. Consider Mag if she develops a worsening HA concerning for symptomatic Pre-E.  Nifedipine increased to 90 mg qDay.   Encounter for induction of labor ([facility])    Pregnancy Problems (from 24 to present)       Problem Noted Resolved    Gestational hypertension, third trimester ([facility]) 2024 by Rosalinda Washington MD No    Priority:  Medium      Overview Signed 2024 11:19 AM by Rosalinda Washington MD     Mild range BP elevation is noted at 37 weeks. This is documented in office and home BP log. She denies s/s of preeclampsia but notes worsening edema.   She will present to labor and delivery for evaluation and likely delivery.         Pregnancy with 37 weeks completed gestation ([facility]) 2024 by Sandy Cespedes, DO No    Priority:  Medium      Encounter for induction of labor ([facility]) 2024 by Sandy Cespedes, DO No    Priority:  Medium      Group B Streptococcus carrier state affecting pregnancy ([facility]) 2024 by Rosalinda Washington MD No    Priority:  Medium      Overview Addendum 2024 12:03 PM by Rosalinda Washington MD     Patient has multiple allergies to various antibiotics. GBBS is resistant to clindamycin. Plan vancomycin while in labor.          37 weeks gestation of pregnancy ([facility]) 2024 by ALEXIS Garcia, APRN-CNP No    Priority:  Medium      Primigravida, third trimester ([facility]) 2024 by ALEXIS Garcia, APRN-CNP No    Priority:  Medium      Maternal  varicella, non-immune ([facility]) 2/29/2024 by ALEXIS Garcia, APRN-CNP No    Priority:  Medium      Overview Signed 2/29/2024  8:43 PM by ALEXIS Garcia, APRN-CNP     Vaccinate PP               Hospital course: gestational hypertension      Subjective   Her pain is well controlled with current medications  She is passing flatus  She is ambulating well  She is tolerating a Adult diet Regular  She reports no breast or nursing problems  She denies emotional concerns today   Her plan for contraception is none         Objective   Allergies:   Amoxicillin, Azithromycin, Cefdinir, Doxycycline, Levofloxacin, Nystatin, Sulfa (sulfonamide antibiotics), and Prednisone         Last Vitals:  Temp Pulse Resp BP MAP Pulse Ox   37.4 °C (99.3 °F) 87 17 (!) 158/101 (rn notified, md notified) 116 95 %     Vitals Min/Max Last 24 Hours:  Temp  Min: 36.5 °C (97.7 °F)  Max: 37.6 °C (99.6 °F)  Pulse  Min: 70  Max: 88  Resp  Min: 16  Max: 18  BP  Min: 140/80  Max: 163/101  MAP (mmHg)  Min: 97  Max: 121    Intake/Output:   No intake or output data in the 24 hours ending 09/03/24 6248    Physical Exam:  General: Examination reveals a well developed, well nourished, female, in no acute distress. She is alert and cooperative.  HEENT: PERRLA. External ears normal. Nose normal, no erythema or discharge. Mouth and throat clear.  Neck: supple, no significant adenopathy.  Lungs: clear to auscultation bilaterally.  Cardiac: regular rate and rhythm, S1, S2 normal, no murmur, click, rub or gallop.  Breasts: lactating, no erythema or tenderness, nipples normal.  Abdomen: soft, non-tender, non-distended, no palpable masses, +BS.  Fundus: firm and 1FW below umbilicus.  Extremities: no redness or tenderness in the calves or thighs, edema 2+ BLE+.  Neurological: alert, oriented, normal speech, no focal findings or movement disorder noted. Reflexes +2 all extremities.  Psychological: awake and alert; oriented to person, place, and  time.    Lab Data:  Labs in chart were reviewed.

## 2024-09-04 VITALS
RESPIRATION RATE: 16 BRPM | SYSTOLIC BLOOD PRESSURE: 136 MMHG | BODY MASS INDEX: 33.77 KG/M2 | TEMPERATURE: 98.6 F | WEIGHT: 235.89 LBS | HEART RATE: 76 BPM | OXYGEN SATURATION: 99 % | HEIGHT: 70 IN | DIASTOLIC BLOOD PRESSURE: 76 MMHG

## 2024-09-04 LAB
ALBUMIN SERPL BCP-MCNC: 3.4 G/DL (ref 3.4–5)
ALP SERPL-CCNC: 91 U/L (ref 33–110)
ALT SERPL W P-5'-P-CCNC: 52 U/L (ref 7–45)
ANION GAP SERPL CALC-SCNC: 12 MMOL/L (ref 10–20)
AST SERPL W P-5'-P-CCNC: 29 U/L (ref 9–39)
BILIRUB SERPL-MCNC: 0.4 MG/DL (ref 0–1.2)
BUN SERPL-MCNC: 10 MG/DL (ref 6–23)
CALCIUM SERPL-MCNC: 8.8 MG/DL (ref 8.6–10.3)
CHLORIDE SERPL-SCNC: 103 MMOL/L (ref 98–107)
CO2 SERPL-SCNC: 26 MMOL/L (ref 21–32)
CREAT SERPL-MCNC: 0.68 MG/DL (ref 0.5–1.05)
EGFRCR SERPLBLD CKD-EPI 2021: >90 ML/MIN/1.73M*2
GLUCOSE SERPL-MCNC: 88 MG/DL (ref 74–99)
POTASSIUM SERPL-SCNC: 3.9 MMOL/L (ref 3.5–5.3)
PROT SERPL-MCNC: 5.6 G/DL (ref 6.4–8.2)
SODIUM SERPL-SCNC: 137 MMOL/L (ref 136–145)

## 2024-09-04 PROCEDURE — 36415 COLL VENOUS BLD VENIPUNCTURE: CPT | Performed by: OBSTETRICS & GYNECOLOGY

## 2024-09-04 PROCEDURE — 2500000002 HC RX 250 W HCPCS SELF ADMINISTERED DRUGS (ALT 637 FOR MEDICARE OP, ALT 636 FOR OP/ED): Performed by: OBSTETRICS & GYNECOLOGY

## 2024-09-04 PROCEDURE — 2500000001 HC RX 250 WO HCPCS SELF ADMINISTERED DRUGS (ALT 637 FOR MEDICARE OP): Performed by: OBSTETRICS & GYNECOLOGY

## 2024-09-04 PROCEDURE — 80053 COMPREHEN METABOLIC PANEL: CPT | Performed by: OBSTETRICS & GYNECOLOGY

## 2024-09-04 PROCEDURE — 99239 HOSP IP/OBS DSCHRG MGMT >30: CPT | Performed by: ADVANCED PRACTICE MIDWIFE

## 2024-09-04 PROCEDURE — 2500000002 HC RX 250 W HCPCS SELF ADMINISTERED DRUGS (ALT 637 FOR MEDICARE OP, ALT 636 FOR OP/ED): Performed by: ADVANCED PRACTICE MIDWIFE

## 2024-09-04 PROCEDURE — 2500000001 HC RX 250 WO HCPCS SELF ADMINISTERED DRUGS (ALT 637 FOR MEDICARE OP): Performed by: ADVANCED PRACTICE MIDWIFE

## 2024-09-04 PROCEDURE — 2500000005 HC RX 250 GENERAL PHARMACY W/O HCPCS: Performed by: OBSTETRICS & GYNECOLOGY

## 2024-09-04 RX ORDER — ACETAMINOPHEN 500 MG
1 TABLET ORAL 2 TIMES DAILY
Qty: 1 KIT | Refills: 0 | Status: SHIPPED | OUTPATIENT
Start: 2024-09-04

## 2024-09-04 RX ORDER — LABETALOL 200 MG/1
200 TABLET, FILM COATED ORAL 2 TIMES DAILY
Qty: 60 TABLET | Refills: 1 | Status: SHIPPED | OUTPATIENT
Start: 2024-09-04 | End: 2024-11-03

## 2024-09-04 RX ORDER — LABETALOL 100 MG/1
200 TABLET, FILM COATED ORAL 2 TIMES DAILY
Status: DISCONTINUED | OUTPATIENT
Start: 2024-09-04 | End: 2024-09-04

## 2024-09-04 RX ORDER — NIFEDIPINE 30 MG/1
30 TABLET, FILM COATED, EXTENDED RELEASE ORAL ONCE
Status: COMPLETED | OUTPATIENT
Start: 2024-09-04 | End: 2024-09-04

## 2024-09-04 RX ORDER — IBUPROFEN 600 MG/1
600 TABLET ORAL EVERY 6 HOURS PRN
Qty: 120 TABLET | Refills: 0 | Status: SHIPPED | OUTPATIENT
Start: 2024-09-04 | End: 2024-10-04

## 2024-09-04 RX ORDER — NIFEDIPINE 60 MG/1
60 TABLET, FILM COATED, EXTENDED RELEASE ORAL 2 TIMES DAILY
Qty: 60 TABLET | Refills: 1 | Status: SHIPPED | OUTPATIENT
Start: 2024-09-04 | End: 2024-11-03

## 2024-09-04 RX ORDER — LABETALOL 100 MG/1
200 TABLET, FILM COATED ORAL 2 TIMES DAILY
Status: DISCONTINUED | OUTPATIENT
Start: 2024-09-04 | End: 2024-09-04 | Stop reason: HOSPADM

## 2024-09-04 RX ORDER — NIFEDIPINE 30 MG/1
30 TABLET, FILM COATED, EXTENDED RELEASE ORAL
Status: DISCONTINUED | OUTPATIENT
Start: 2024-09-04 | End: 2024-09-04

## 2024-09-04 RX ORDER — IBUPROFEN 600 MG/1
600 TABLET ORAL EVERY 6 HOURS SCHEDULED
Qty: 56 TABLET | Refills: 0 | Status: SHIPPED | OUTPATIENT
Start: 2024-09-04 | End: 2024-09-18

## 2024-09-04 RX ORDER — NIFEDIPINE 30 MG/1
60 TABLET, FILM COATED, EXTENDED RELEASE ORAL 2 TIMES DAILY
Status: DISCONTINUED | OUTPATIENT
Start: 2024-09-04 | End: 2024-09-04

## 2024-09-04 RX ORDER — NIFEDIPINE 30 MG/1
60 TABLET, FILM COATED, EXTENDED RELEASE ORAL 2 TIMES DAILY
Status: DISCONTINUED | OUTPATIENT
Start: 2024-09-04 | End: 2024-09-04 | Stop reason: HOSPADM

## 2024-09-04 ASSESSMENT — PAIN SCALES - GENERAL
PAINLEVEL_OUTOF10: 1
PAINLEVEL_OUTOF10: 3
PAINLEVEL_OUTOF10: 4

## 2024-09-04 NOTE — DISCHARGE INSTRUCTIONS
Discharge instructions:     Call 911 or go to nearest emergency room right away if you have: PAIN or pressure in chest, OBSTRUCTED breathing or shortness of breath, SEIZURES, THOUGHTS of hurting yourself or your baby, heart palpitations/racing, change in alertness/confusion.    Pain Management:  -  Use acetaminophen (Tylenol)  as recommended on the bottle  -  Use ibuprofen (Motrin, Advil) as recommended on the bottle     Perineal Care:   -  If you have stiches, they should dissolve on their own within 6 weeks  -  Use anais/squirt bottle to rinse your perineal area with warm water after bowel movements and urination until vaginal bleeding ceases.  -  You may use Sitz baths for 10-15 minutes, 3-4x a day  -  Consider ice packs, as well as medicated pads and sprays for discomfort   -  It is important to stay hydrated, and to take stool softeners (docusate sodium/Colace) if needed, to keep your bowels soft while your bottom is healing. Milk of Magnesia and MiraLax (polyethylene glycol) may also be helpful.     Diet/supplementation:  -  Resume your pre-hospital diet  -  Drink plenty of water, especially if you are breastfeeding  -  Take your prenatal vitamin for as long as you are breastfeeding. Consider 5-6,000IU of vitamin D3 if you are breastfeeding to increase vitamin D levels in your breastmilk. This also may decrease incidence of “baby blues” and mood disturbance    Activity after discharge:  -  6 weeks pelvic rest. Please don’t have sex or put anything in your vaginal, including tampons or douching  -  Shower daily. If you have an incision, blow-dry on low, cool setting, or towel-dry the area  -  No lifting greater than 15lbs for 6 weeks  -  Walking and stairs as tolerated  -  Gradually increase your activity level until back to normal at approximately 6 weeks postpartum    Follow up appointment:  -  6 weeks  -  May have evaluation at 1-3 weeks for closer monitoring    Please call if:  -  You have feelings that  you want to harm yourself or others  -  You are having large-sized blood clots or soaking more than one large pad in an hour  -  You have vaginal discharge with a foul odor  -  You are having pain or burning with urination  -  Your temperature is greater than 100.4 F  -  Your pain is not controlled by the pain medication you are taking  -  You have trouble breathing at rest or when lying flat in bed  -  You have pain or tenderness in your calves  -  You are feeling weak, faint or dizzy  -  You experience worsening swelling to the feet or legs  -  You experience severe headaches and/or vision changes and/or pain in your upper abdomen   -  You experience severe chest pain  -  You have a painful, red area on your breast    Important phone numbers:  -  Answering service available 24 hours a day: [phone]  -  Midwife office: [phone]

## 2024-09-04 NOTE — LACTATION NOTE
Lactation Consultant Note  Lactation Consultation  Reason for Consult: Follow-up assessment  Consultant Name: Jenna CABRERA    Maternal Information  Has mother  before?: No  Infant to breast within first 2 hours of birth?: Yes  Exclusive Pump and Bottle Feed: No    Maternal Assessment  Breast Assessment: Large, Soft, Compressible, Filling  Nipple Assessment: Intact, Erect  Areola Assessment: Normal    Infant Assessment  Infant Behavior: Awake    Feeding Assessment  Nutrition Source: Breastmilk  Feeding Method: Nursing at the breast  Feeding Position: Football/seated, Cross - cradle, Cradle  Suck/Feeding: Sustained, Tactile stimulation needed  Latch Assessment: Deep latch obtained, Minimal assistance is needed, Sucking and swallowing, Flanged lips, Sucks with long jaw movement, Chin moves in rhythmic motion, Frequent audible swallows    LATCH TOOL  Latch: Grasps breast, tongue down, lips flanged, rhythmic sucking  Audible Swallowing: Spontaneous and intermittent (24 hours old)  Type of Nipple: Everted (After stimulation)  Comfort (Breast/Nipple): Soft/non-tender  Hold (Positioning): Minimal assist, teach one side, mother does other, staff holds  LATCH Score: 9    Breast Pump  Pump: Hospital grade electric pump  Frequency:  (as needed to supplement)  Duration: 15-20 minutes per session  Breast Shield Size and Type: 21 mm  Volume of Milk Production: 40  Units of Volume: mL per session    Other OB Lactation Tools       Patient Follow-up  Inpatient Lactation Follow-up Needed : Yes    Other OB Lactation Documentation  Maternal Risk Factors: Age over 30, primiparity  Infant Risk Factors: Early term birth 37-39 weeks    Recommendations/Summary  Latched to both breasts without the nipple shield. Baby latched deeply and fed well sucking with long jaw movement. swallows noted. Mom will continue to latch without the shield to facilitate a deeper latch with more milk transfer now that baby is latching more easily. Mom will  continue to pump to offer Ebm as needed to supplement if baby is not satiated after breastfeeding. Mom was asked to attempt/feed baby at least every 2-3 hours or on demand with a goal of 8-12 feeds daily. Feeding cues reviewed.Breastfeeding education reviewed and questions answered. Mom aware of lactation support and asked to call out for feed assistance and with questions as needed.

## 2024-09-04 NOTE — PROGRESS NOTES
Social Work Brief Note      met with parents for follow up visit from yesterday. Mrs. Rebolledo reports feeling better today with a slight headache. Parents are feeling ready to go home. SW reviewed red folder information including PPD and Safe Sleep. Parents are currently filling out birth certificate. [place] will be on Mr. Rebolledo's insurance and he has been in contact with his employer to add baby. Parents will be using [facility] for pediatric follow up care. They had a car seat for discharge. They had no additional questions or concerns.       Signature: DONA Bettencourt

## 2024-09-04 NOTE — PROGRESS NOTES
Postpartum Progress Note:    Subjective   34 y.o.  on day 5 postpartum/postop  Feels well & happy  Feeding breast  Pain: well controlled  Perineum: well approximated  Void/flatus/bm    Objective    Last Vitals  Temp Pulse Resp BP MAP O2 Sat   37.2 °C (99 °F) 93 16 (!) 151/89   99 %     ALEXIS Araiza        Assessment/Plan   Marisol eRbolledo is a 34 y.o., , who delivered at 37w4d gestation and is now postpartum day 5.  She has been treated with nifedipine that has been titrated up, no at 60 BID. Her BP today is consistently high mild range. Reviewed with Dr. Cespedes - plan to add labetalol 200 mg bid and review Bps at the rashaad of the day and assess if patient is safe for discharge at that time.   Continue routine postpartum care  Pain well controlled on PO medications  Patient has been assessed to have a DVT risk score of 5 , prophylactic lovenox indicated  Patient is Rh Positive (Rh+)., baby is Rh Positive (Rh+).; Not eligible  Encouraged breastfeeding, lactation consult as needed  Contraception methods discussed, plans   Bonding well with baby  Normal recovery & involution to date. Appropriate for discharge on PPD #5 if remains stable  Advised patient to follow up in 72 hours for BP check and 4-6 weeks with primary OB provider for routine postpartum visit    ALEXIS Araiza

## 2024-09-04 NOTE — DISCHARGE SUMMARY
Discharge Summary    Admission Date: 8/29/2024  Discharge Date: 9/4/2024      Discharge Diagnosis  Pregnancy with 37 weeks completed gestation ([facility])    Hospital Course  Delivery Date: 8/30/2024 11:58 AM  Delivery type: Vaginal, Forceps   GA at delivery: 37w4d  Outcome: Living  Anesthesia during delivery: Epidural  Intrapartum complications: None  Feeding method: Breastfeeding Status: Yes     Procedures: none  Contraception at discharge: none      Pertinent Physical Exam At Time of Discharge    General: Examination reveals a well developed, well nourished, female, in no acute distress. She is alert and cooperative.  Fundus: firm, below umbilicus, and nontender.  Perineum: well approximated.  Psychological: awake and alert; oriented to person, place, and time.  Denies headache, visual changes, epigastric pain, SOB or chest pain    Last Vitals:  Temp Pulse Resp BP MAP Pulse Ox   37.3 °C (99.1 °F) 71 18 (!) 147/92 103 98 %     Discharge Meds     Your medication list        START taking these medications        Instructions Last Dose Given Next Dose Due   labetalol 200 mg tablet  Commonly known as: Normodyne      Take 1 tablet (200 mg) by mouth 2 times a day.       NIFEdipine ER 60 mg 24 hr tablet  Commonly known as: Adalat CC      Take 1 tablet (60 mg) by mouth 2 times a day. Do not crush, chew, or split.              CHANGE how you take these medications        Instructions Last Dose Given Next Dose Due   ibuprofen 600 mg tablet      Take 1 tablet (600 mg) by mouth every 6 hours for 14 days.       ibuprofen 600 mg tablet      Take 1 tablet (600 mg) by mouth every 6 hours if needed for mild pain (1 - 3).              CONTINUE taking these medications        Instructions Last Dose Given Next Dose Due   budesonide-formoteroL 160-4.5 mcg/actuation inhaler  Commonly known as: Symbicort      Inhale 1 puff 2 times a day. Rinse mouth with water after use to reduce aftertaste and incidence of candidiasis. Do not swallow.        nebulizer and compressor device      1 Device every 6 hours if needed (as needed for cough/wheezing).       prenatal vitamin (iron-folic) 27 mg iron-800 mcg folic acid tablet      Take 1 tablet by mouth once daily.              STOP taking these medications      albuterol 0.63 mg/3 mL nebulizer solution        albuterol 90 mcg/actuation inhaler        PRENATAL 2-IRON-FOLIC ACID-OM3 ORAL                  Where to Get Your Medications        These medications were sent to [id number] - [place], [place] - [address]  [address] [address]      Phone: [phone]   ibuprofen 600 mg tablet  ibuprofen 600 mg tablet  labetalol 200 mg tablet  NIFEdipine ER 60 mg 24 hr tablet          Complications Requiring Follow-Up  ghtn    Test Results Pending At Discharge  Pending Labs       Order Current Status    Urine culture Collected (09/01/24 1114)    Surgical Pathology Exam - [facility] In process            Outpatient Follow-Up  Future Appointments   Date Time Provider Department Center   9/9/2024  2:30 PM ALEXIS Garcia, APRN-CNP [id number]   9/16/2024  2:45 PM [id number] spent >30 minutes in the professional and overall care of this patient.      ALEXIS Araiza

## 2024-09-06 LAB
LABORATORY COMMENT REPORT: NORMAL
PATH REPORT.FINAL DX SPEC: NORMAL
PATH REPORT.GROSS SPEC: NORMAL
PATH REPORT.RELEVANT HX SPEC: NORMAL
PATH REPORT.TOTAL CANCER: NORMAL

## 2024-09-09 ENCOUNTER — POSTPARTUM VISIT (OUTPATIENT)
Dept: OBSTETRICS AND GYNECOLOGY | Facility: CLINIC | Age: 34
End: 2024-09-09
Payer: COMMERCIAL

## 2024-09-09 ENCOUNTER — APPOINTMENT (OUTPATIENT)
Dept: OBSTETRICS AND GYNECOLOGY | Facility: CLINIC | Age: 34
End: 2024-09-09
Payer: COMMERCIAL

## 2024-09-09 VITALS
BODY MASS INDEX: 30.35 KG/M2 | HEIGHT: 70 IN | WEIGHT: 212 LBS | DIASTOLIC BLOOD PRESSURE: 88 MMHG | SYSTOLIC BLOOD PRESSURE: 134 MMHG

## 2024-09-09 DIAGNOSIS — O13.3 GESTATIONAL HYPERTENSION, THIRD TRIMESTER (HHS-HCC): ICD-10-CM

## 2024-09-09 DIAGNOSIS — R63.39 BREAST FEEDING PROBLEM IN INFANT: ICD-10-CM

## 2024-09-09 ASSESSMENT — EDINBURGH POSTNATAL DEPRESSION SCALE (EPDS)
I HAVE BEEN SO UNHAPPY THAT I HAVE HAD DIFFICULTY SLEEPING: NOT AT ALL
I HAVE FELT SAD OR MISERABLE: NO, NOT AT ALL
I HAVE BEEN SO UNHAPPY THAT I HAVE BEEN CRYING: NO, NEVER
I HAVE FELT SCARED OR PANICKY FOR NO GOOD REASON: NO, NOT AT ALL
I HAVE LOOKED FORWARD WITH ENJOYMENT TO THINGS: AS MUCH AS I EVER DID
I HAVE BEEN ABLE TO LAUGH AND SEE THE FUNNY SIDE OF THINGS: AS MUCH AS I ALWAYS COULD
THINGS HAVE BEEN GETTING ON TOP OF ME: NO, MOST OF THE TIME I HAVE COPED QUITE WELL
I HAVE BEEN ANXIOUS OR WORRIED FOR NO GOOD REASON: NO, NOT AT ALL
I HAVE BLAMED MYSELF UNNECESSARILY WHEN THINGS WENT WRONG: NO, NEVER
THE THOUGHT OF HARMING MYSELF HAS OCCURRED TO ME: NEVER
TOTAL SCORE: 1

## 2024-09-09 NOTE — PROGRESS NOTES
Patient presents to the office today for a postpartum exam.     Subjective   34 y.o.  presenting for postpartum follow-up.     S/P Forceps delivery,  with gHTN and PP management with blood pressure medications  She is overall feeling well.   Taking Labetalol and Procardia for blood pressure control. Blood perssures at home have been 112/62-140s/90s.    Many questions about breastfeeding, pumps setting and supplementation. Including concerns for trauma to her breast from pumping. Also, Marisol is sleeping for extended lengths of time at night and not pumping or nursing.   Her  asked about what to do with the fact that Marisol, as she has a hard time staying awake at night. She states she doesn't hear the baby at night. I encouraged them to share the opportunity of taking the baby and I encouraged Marisol to sleep when the baby sleeps. I encouraged them to limit company during the day as well.     She states her bladder and bowel function have returned to normal.   She denies fever/chills/N&V.   Bleeding has decreased and is now spotting.  Pain: controlled with NSAIDs and   Lacerations:   Laceration Repaired?   Perineal: 2nd     Periurethral:       Labial:       Sulcus:       Vaginal:       Cervical:           Repair suture: 3-0 Synthetic Suture   Number of repair packets:     Blood loss (mL):     Total estimated blood loss (mL): 0     Her Laceration is healing well  Sexual Intimacy: No  Contraceptive Method: None  Feeding Method: She is breast feeding exclusively. no breast or nursing problems  Lactation Consult Needed?: No    Delivery Date: 2024  Type of Delivery: Vaginal, Forceps     Pregnancy Problems (from 24 to present)       Problem Noted Resolved    Gestational hypertension, third trimester ([facility]) 2024 by Rosalinda Washington MD No    Priority:  Medium      Overview Signed 2024 11:19 AM by Rosalinda Washington MD     Mild range BP elevation is noted at 37 weeks. This  "is documented in office and home BP log. She denies s/s of preeclampsia but notes worsening edema.   She will present to labor and delivery for evaluation and likely delivery.         Pregnancy with 37 weeks completed gestation ([facility]) 8/29/2024 by Sandy Cespedes, DO No    Priority:  Medium      Encounter for induction of labor ([facility]) 8/29/2024 by Sandy Cespedes, DO No    Priority:  Medium      Group B Streptococcus carrier state affecting pregnancy ([facility]) 8/27/2024 by Rosalinda Washington MD No    Priority:  Medium      Overview Addendum 8/28/2024 12:03 PM by Rosalinda Washington MD     Patient has multiple allergies to various antibiotics. GBBS is resistant to clindamycin. Plan vancomycin while in labor.          37 weeks gestation of pregnancy ([facility]) 8/5/2024 by ALEXIS Garcia, APRN-CNP No    Priority:  Medium      Primigravida, third trimester ([facility]) 8/5/2024 by ALEXIS Garcia, APRN-CNP No    Priority:  Medium      Maternal varicella, non-immune ([facility]) 2/29/2024 by ADRIANA Garcia-CORRINE, APRN-CNP No    Priority:  Medium      Overview Signed 2/29/2024  8:43 PM by ALEXIS Garcia, APRN-CNP     Vaccinate PP                     Birth Trauma: Yes, describe: Forcep delivery   Bonding with Baby: well with Male [unfilled]   Mood:         Objective:  Constitutional; alert with no acute distress.  Well-nourished.    deferred      Skin: Normal skin color and pigmentation    /88   Ht 1.778 m (5' 10\")   Wt 96.2 kg (212 lb)   LMP 12/11/2023 (Exact Date)   Breastfeeding Yes   BMI 30.42 kg/m²      IMPRESSIONS:  Advised to call office for breast complaints, abnormal bleeding, mood changes, or other concerning symptoms.   Cleared to resume normal activity as desired  RTO 1 week with a physician, due to HTN being managed with 2 medications.       Diagnoses and all orders for this visit:  Postpartum care following vaginal delivery ([facility])  Gestational " hypertension, third trimester ([facility])  Breast feeding problem in infant    Referred to lactation for support. Pt to follow with WIC as well. She states she was referred to WIC for Lactation support but didn't reach out.     Continued B/P medications  RTO with a physician for follow up in 1 weeks, discussed holding Labetalol if B/P is 120/80 or less prior to administration. Pt also advised to continue the Procardia BID at this time. She was also told to call prn with concerns for weaning of s/s of blood pressure dropping while weaning.     No follow-ups on file.     Mesha Velasco, APRN-CNM, APRN-CNP

## 2024-09-11 ENCOUNTER — TELEPHONE (OUTPATIENT)
Dept: OBSTETRICS AND GYNECOLOGY | Facility: CLINIC | Age: 34
End: 2024-09-11
Payer: COMMERCIAL

## 2024-09-11 NOTE — TELEPHONE ENCOUNTER
Pt is on nifedipine and labetalol postpartum. This morning her systolic was 124 (unsure of diastolic), so she held labetalol dose but did take nifedipine. (Pt was advised to hold labetalol if systolic <120). Since then, pt had noticed increased edema in her feet- edema is pitting, and has a mild headache. Pt has not checked BP since symptoms started. Pt is about 20 mins from home, will check when she gets home and will give us a call back so that we can make a plan of care.

## 2024-09-11 NOTE — TELEPHONE ENCOUNTER
Pt called back in, /77. Reviewed with pt that edema more likely related to being postpartum than to skipping a dose of labetalol, continue to monitor and notify us if swelling of face or hands. Elevate feet when able to and hydrate well to encourage reduction in swelling. Pt agreeable. Will check BP prior to PM dose of meds and will hold labetalol again if systolic <120. I reviewed with her that if she is unsure whether to take it or not, she can always call in to ask (reviewed answering service available if we are out of office)

## 2024-09-14 ENCOUNTER — TELEPHONE (OUTPATIENT)
Dept: OBSTETRICS AND GYNECOLOGY | Facility: HOSPITAL | Age: 34
End: 2024-09-14
Payer: COMMERCIAL

## 2024-09-14 NOTE — TELEPHONE ENCOUNTER
Patient calling with concerns of urinary retention. PP day 12. States she had multiple caths during admission. Forcep delivery. Denies burning with urination; however, she feels she has not been able to adequately empty her bladder over the past 2 days. No abdominal pain.    Discussed recommendation to be seen in triage for possible urinary retention; however, the patient states she lives over an hr away and does not want to come to triage. Encouraged to call provider's office (Campos Velasco) on Monday. Encouraged to call if symptoms worsen. Patient verbalized understanding.    Anabell Shah, ADRIANA-CORRINE

## 2024-09-16 ENCOUNTER — APPOINTMENT (OUTPATIENT)
Dept: OBSTETRICS AND GYNECOLOGY | Facility: CLINIC | Age: 34
End: 2024-09-16
Payer: COMMERCIAL

## 2024-09-16 ENCOUNTER — HOSPITAL ENCOUNTER (EMERGENCY)
Age: 34
Discharge: HOME | End: 2024-09-16
Attending: EMERGENCY MEDICINE

## 2024-09-16 ENCOUNTER — TELEPHONE (OUTPATIENT)
Dept: OBSTETRICS AND GYNECOLOGY | Facility: CLINIC | Age: 34
End: 2024-09-16

## 2024-09-16 ENCOUNTER — APPOINTMENT (OUTPATIENT)
Dept: GENERAL RADIOLOGY | Age: 34
End: 2024-09-16

## 2024-09-16 VITALS
RESPIRATION RATE: 20 BRPM | TEMPERATURE: 98 F | DIASTOLIC BLOOD PRESSURE: 80 MMHG | SYSTOLIC BLOOD PRESSURE: 133 MMHG | OXYGEN SATURATION: 98 % | HEART RATE: 90 BPM

## 2024-09-16 DIAGNOSIS — K59.00 CONSTIPATION, UNSPECIFIED CONSTIPATION TYPE: Primary | ICD-10-CM

## 2024-09-16 LAB
ALBUMIN SERPL-MCNC: 4.4 G/DL (ref 3.5–5.2)
ALP SERPL-CCNC: 92 U/L (ref 35–104)
ALT SERPL-CCNC: 14 U/L (ref 0–32)
ANION GAP SERPL CALCULATED.3IONS-SCNC: 14 MMOL/L (ref 7–16)
AST SERPL-CCNC: 13 U/L (ref 0–31)
BASOPHILS # BLD: 0.03 K/UL (ref 0–0.2)
BASOPHILS NFR BLD: 0 % (ref 0–2)
BILIRUB SERPL-MCNC: 0.3 MG/DL (ref 0–1.2)
BILIRUB UR QL STRIP: NEGATIVE
BUN SERPL-MCNC: 11 MG/DL (ref 6–20)
CALCIUM SERPL-MCNC: 9.1 MG/DL (ref 8.6–10.2)
CHLORIDE SERPL-SCNC: 103 MMOL/L (ref 98–107)
CLARITY UR: CLEAR
CO2 SERPL-SCNC: 23 MMOL/L (ref 22–29)
COLOR UR: YELLOW
CREAT SERPL-MCNC: 0.8 MG/DL (ref 0.5–1)
EOSINOPHIL # BLD: 0.07 K/UL (ref 0.05–0.5)
EOSINOPHILS RELATIVE PERCENT: 1 % (ref 0–6)
EPI CELLS #/AREA URNS HPF: ABNORMAL /HPF
ERYTHROCYTE [DISTWIDTH] IN BLOOD BY AUTOMATED COUNT: 13.1 % (ref 11.5–15)
GFR, ESTIMATED: >90 ML/MIN/1.73M2
GLUCOSE SERPL-MCNC: 108 MG/DL (ref 74–99)
GLUCOSE UR STRIP-MCNC: NEGATIVE MG/DL
HCT VFR BLD AUTO: 36.7 % (ref 34–48)
HGB BLD-MCNC: 12.4 G/DL (ref 11.5–15.5)
HGB UR QL STRIP.AUTO: ABNORMAL
IMM GRANULOCYTES # BLD AUTO: 0.19 K/UL (ref 0–0.58)
IMM GRANULOCYTES NFR BLD: 2 % (ref 0–5)
KETONES UR STRIP-MCNC: 15 MG/DL
LEUKOCYTE ESTERASE UR QL STRIP: ABNORMAL
LYMPHOCYTES NFR BLD: 1.3 K/UL (ref 1.5–4)
LYMPHOCYTES RELATIVE PERCENT: 13 % (ref 20–42)
MCH RBC QN AUTO: 28.8 PG (ref 26–35)
MCHC RBC AUTO-ENTMCNC: 33.8 G/DL (ref 32–34.5)
MCV RBC AUTO: 85.3 FL (ref 80–99.9)
MONOCYTES NFR BLD: 0.54 K/UL (ref 0.1–0.95)
MONOCYTES NFR BLD: 6 % (ref 2–12)
MUCOUS THREADS URNS QL MICRO: PRESENT
NEUTROPHILS NFR BLD: 78 % (ref 43–80)
NEUTS SEG NFR BLD: 7.6 K/UL (ref 1.8–7.3)
NITRITE UR QL STRIP: NEGATIVE
PH UR STRIP: 5.5 [PH] (ref 5–9)
PLATELET # BLD AUTO: 317 K/UL (ref 130–450)
PMV BLD AUTO: 9.4 FL (ref 7–12)
POTASSIUM SERPL-SCNC: 3.9 MMOL/L (ref 3.5–5)
PROT SERPL-MCNC: 6.9 G/DL (ref 6.4–8.3)
PROT UR STRIP-MCNC: NEGATIVE MG/DL
RBC # BLD AUTO: 4.3 M/UL (ref 3.5–5.5)
RBC #/AREA URNS HPF: ABNORMAL /HPF
SODIUM SERPL-SCNC: 140 MMOL/L (ref 132–146)
SP GR UR STRIP: 1.02 (ref 1–1.03)
UROBILINOGEN UR STRIP-ACNC: 0.2 EU/DL (ref 0–1)
WBC #/AREA URNS HPF: ABNORMAL /HPF
WBC OTHER # BLD: 9.7 K/UL (ref 4.5–11.5)

## 2024-09-16 PROCEDURE — 99284 EMERGENCY DEPT VISIT MOD MDM: CPT

## 2024-09-16 PROCEDURE — 81001 URINALYSIS AUTO W/SCOPE: CPT

## 2024-09-16 PROCEDURE — 74018 RADEX ABDOMEN 1 VIEW: CPT

## 2024-09-16 PROCEDURE — 85025 COMPLETE CBC W/AUTO DIFF WBC: CPT

## 2024-09-16 PROCEDURE — 2580000003 HC RX 258

## 2024-09-16 PROCEDURE — 80053 COMPREHEN METABOLIC PANEL: CPT

## 2024-09-16 PROCEDURE — 6370000000 HC RX 637 (ALT 250 FOR IP)

## 2024-09-16 RX ORDER — LIDOCAINE HYDROCHLORIDE 20 MG/ML
JELLY TOPICAL ONCE
Status: COMPLETED | OUTPATIENT
Start: 2024-09-16 | End: 2024-09-16

## 2024-09-16 RX ORDER — 0.9 % SODIUM CHLORIDE 0.9 %
1000 INTRAVENOUS SOLUTION INTRAVENOUS ONCE
Status: COMPLETED | OUTPATIENT
Start: 2024-09-16 | End: 2024-09-16

## 2024-09-16 RX ADMIN — SODIUM CHLORIDE 1000 ML: 9 INJECTION, SOLUTION INTRAVENOUS at 15:48

## 2024-09-16 RX ADMIN — LIDOCAINE HYDROCHLORIDE: 20 JELLY TOPICAL at 19:56

## 2024-09-16 ASSESSMENT — LIFESTYLE VARIABLES
HOW MANY STANDARD DRINKS CONTAINING ALCOHOL DO YOU HAVE ON A TYPICAL DAY: PATIENT DOES NOT DRINK
HOW OFTEN DO YOU HAVE A DRINK CONTAINING ALCOHOL: NEVER

## 2024-09-16 ASSESSMENT — PAIN SCALES - GENERAL: PAINLEVEL_OUTOF10: 8

## 2024-09-16 ASSESSMENT — PAIN - FUNCTIONAL ASSESSMENT: PAIN_FUNCTIONAL_ASSESSMENT: NONE - DENIES PAIN

## 2024-09-18 ENCOUNTER — APPOINTMENT (OUTPATIENT)
Dept: OBSTETRICS AND GYNECOLOGY | Facility: CLINIC | Age: 34
End: 2024-09-18
Payer: COMMERCIAL

## 2024-09-18 VITALS — WEIGHT: 210.8 LBS | SYSTOLIC BLOOD PRESSURE: 100 MMHG | DIASTOLIC BLOOD PRESSURE: 70 MMHG | BODY MASS INDEX: 30.25 KG/M2

## 2024-09-18 DIAGNOSIS — O13.9 GESTATIONAL HYPERTENSION, ANTEPARTUM (HHS-HCC): Primary | ICD-10-CM

## 2024-09-18 PROBLEM — O09.899 MATERNAL VARICELLA, NON-IMMUNE (HHS-HCC): Status: RESOLVED | Noted: 2024-02-29 | Resolved: 2024-08-30

## 2024-09-18 PROBLEM — Z3A.37 37 WEEKS GESTATION OF PREGNANCY (HHS-HCC): Status: RESOLVED | Noted: 2024-08-05 | Resolved: 2024-08-30

## 2024-09-18 PROBLEM — Z3A.37 PREGNANCY WITH 37 WEEKS COMPLETED GESTATION (HHS-HCC): Status: RESOLVED | Noted: 2024-08-29 | Resolved: 2024-08-30

## 2024-09-18 PROBLEM — Z28.39 MATERNAL VARICELLA, NON-IMMUNE (HHS-HCC): Status: RESOLVED | Noted: 2024-02-29 | Resolved: 2024-08-30

## 2024-09-18 PROBLEM — O13.3 GESTATIONAL HYPERTENSION, THIRD TRIMESTER (HHS-HCC): Status: RESOLVED | Noted: 2024-08-29 | Resolved: 2024-08-30

## 2024-09-18 PROBLEM — Z34.03 PRIMIGRAVIDA, THIRD TRIMESTER (HHS-HCC): Status: RESOLVED | Noted: 2024-08-05 | Resolved: 2024-08-30

## 2024-09-18 PROBLEM — O99.820 GROUP B STREPTOCOCCUS CARRIER STATE AFFECTING PREGNANCY: Chronic | Status: RESOLVED | Noted: 2024-08-27 | Resolved: 2024-08-30

## 2024-09-18 PROBLEM — Z34.90 ENCOUNTER FOR INDUCTION OF LABOR: Status: RESOLVED | Noted: 2024-08-29 | Resolved: 2024-08-30

## 2024-09-18 ASSESSMENT — EDINBURGH POSTNATAL DEPRESSION SCALE (EPDS)
I HAVE BLAMED MYSELF UNNECESSARILY WHEN THINGS WENT WRONG: NO, NEVER
THINGS HAVE BEEN GETTING ON TOP OF ME: NO, I HAVE BEEN COPING AS WELL AS EVER
I HAVE BEEN SO UNHAPPY THAT I HAVE BEEN CRYING: NO, NEVER
I HAVE BEEN SO UNHAPPY THAT I HAVE HAD DIFFICULTY SLEEPING: NOT AT ALL
TOTAL SCORE: 0
THE THOUGHT OF HARMING MYSELF HAS OCCURRED TO ME: NEVER
I HAVE FELT SAD OR MISERABLE: NO, NOT AT ALL
I HAVE BEEN ABLE TO LAUGH AND SEE THE FUNNY SIDE OF THINGS: AS MUCH AS I ALWAYS COULD
I HAVE BEEN ANXIOUS OR WORRIED FOR NO GOOD REASON: NO, NOT AT ALL
I HAVE FELT SCARED OR PANICKY FOR NO GOOD REASON: NO, NOT AT ALL
I HAVE LOOKED FORWARD WITH ENJOYMENT TO THINGS: AS MUCH AS I EVER DID

## 2024-09-18 NOTE — PROGRESS NOTES
Subjective   Patient ID: Marisol Rebolledo is a 34 y.o. female who presents for Postpartum Care (2 week PP /BP check/ER visit 9-16-24 severe compaction/Would like Prenatal vitamin without iron).  Patient is doing well. She presents for a post partum BP check. No symptoms. Bps are reassuring. No fevers, chills. No HA/vision changes. No RUQ pain, n/v. No chest pain or SOB. No calf pain.          Review of Systems   All other systems reviewed and are negative.      Objective   Physical Exam  General: A&Ox3  Head: Normocephalic, atraumatic  Heart/Lungs: RRR, No murmurs, gallops, or rubs. Lungs are CTAB.  Abdomen: Soft, nontender. BS+4. No bruising or masses.  Lower Extremities: No lower extremity Edema no palpable cords.       Assessment/Plan     Problem List Items Addressed This Visit       Gestational hypertension, antepartum ([facility]) - Primary    Overview     Doing well on labetalol 200 mg BID with Nifedipine 60 mg qDay. Discussed decreasing Nifedipine to 30 mg qDay with the labetalol. Discussed that we will slowly decrease these medications over the next 6 weeks. Follow up in 1 week for pp visit.              Jorge Wright MD 09/25/24 3:25 AM

## 2024-09-24 ENCOUNTER — APPOINTMENT (OUTPATIENT)
Dept: OBSTETRICS AND GYNECOLOGY | Facility: CLINIC | Age: 34
End: 2024-09-24
Payer: COMMERCIAL

## 2024-09-24 ENCOUNTER — CLINICAL SUPPORT (OUTPATIENT)
Dept: OBSTETRICS AND GYNECOLOGY | Facility: CLINIC | Age: 34
End: 2024-09-24
Payer: COMMERCIAL

## 2024-09-24 NOTE — PROGRESS NOTES
Pt was scheduled for PPV with Mesha Velasco CNM but was late so visit was rescheduled to Friday and she was put on nurse schedule for BP check. Reviewed BP log, Bps have been normotensive since 9/20/24 on 60mg of nifedipine. BP today 116/70. Denies headaches/worsening edema. Reviewed with Portia and she recommends weaning nifedipine to 30mg BID. Pt informed of this and instructed to call if Bps are >140/90 after decreasing dose. Pt agreeable

## 2024-09-25 PROBLEM — O13.9 GESTATIONAL HYPERTENSION, ANTEPARTUM (HHS-HCC): Status: ACTIVE | Noted: 2024-08-29

## 2024-09-25 RX ORDER — NIFEDIPINE 30 MG/1
30 TABLET, FILM COATED, EXTENDED RELEASE ORAL 2 TIMES DAILY
Qty: 60 TABLET | Refills: 0 | Status: SHIPPED | OUTPATIENT
Start: 2024-09-25 | End: 2025-09-25

## 2024-09-27 ENCOUNTER — APPOINTMENT (OUTPATIENT)
Dept: OBSTETRICS AND GYNECOLOGY | Facility: CLINIC | Age: 34
End: 2024-09-27
Payer: COMMERCIAL

## 2024-09-27 ENCOUNTER — POSTPARTUM VISIT (OUTPATIENT)
Dept: OBSTETRICS AND GYNECOLOGY | Facility: CLINIC | Age: 34
End: 2024-09-27
Payer: COMMERCIAL

## 2024-09-27 VITALS — DIASTOLIC BLOOD PRESSURE: 70 MMHG | SYSTOLIC BLOOD PRESSURE: 100 MMHG | WEIGHT: 208.4 LBS | BODY MASS INDEX: 29.9 KG/M2

## 2024-09-27 DIAGNOSIS — Z01.30 BLOOD PRESSURE CHECK: ICD-10-CM

## 2024-09-27 PROCEDURE — 0503F POSTPARTUM CARE VISIT: CPT | Performed by: MIDWIFE

## 2024-09-27 NOTE — PROGRESS NOTES
Subjective   Patient ID: Marisol Rebolledo is a 34 y.o. female who presents for Postpartum Care (Bp check).  This 33yo presents at 4W following a vaginal delivery, her recovery was c/b gestational hypertension with medications. She self discontinued labetalol on 9/10 and had been on nifedipine 60mg twice daily until earlier this week. She was weaned to 30mg twice daily and presents today to follow up. She brings her BP log. She has been feeling well and denies headache, blurred vision, chest or RUQ pain and notes edema is improving. She is voiding and defecating without event and reports little to no bleeding. Her perineum is healing. She is occasionally breastfeeding and supplementing with formula. She endorses effective coping and is slowly getting adjusted to the baby and routines. She is still considering contraception methods.     We reviewed her BP log and values have remained stable at or below 130s/80s since decreasing the nifedipine. She will continue through Monday and then decrease again to 30mg once daily on Tuesday. We will then follow up virtually at the end of next week to reevaluate her log and symptoms and anticipate discontinuation the following week if able. That will be her 6th week postpartum and she is scheduled for a follow up in office. She is agreeable to this plan.      Review of Systems   Constitutional: Negative.    HENT: Negative.     Eyes: Negative.    Respiratory: Negative.     Cardiovascular: Negative.    Gastrointestinal: Negative.    Endocrine: Negative.    Genitourinary: Negative.    Musculoskeletal: Negative.    Skin: Negative.    Allergic/Immunologic: Negative.    Neurological: Negative.    Hematological: Negative.    Psychiatric/Behavioral:  Positive for sleep disturbance.        Objective   Physical Exam  Vitals and nursing note reviewed.   Constitutional:       Appearance: Normal appearance.   HENT:      Head: Normocephalic and atraumatic.      Right Ear: External ear normal.       Left Ear: External ear normal.      Nose: Nose normal.      Mouth/Throat:      Mouth: Mucous membranes are moist.      Pharynx: Oropharynx is clear.   Eyes:      Extraocular Movements: Extraocular movements intact.      Conjunctiva/sclera: Conjunctivae normal.      Pupils: Pupils are equal, round, and reactive to light.   Pulmonary:      Effort: Pulmonary effort is normal.   Abdominal:      Palpations: Abdomen is soft.   Musculoskeletal:         General: Normal range of motion.   Skin:     General: Skin is warm and dry.   Neurological:      General: No focal deficit present.      Mental Status: She is alert and oriented to person, place, and time.   Psychiatric:         Mood and Affect: Mood normal.         Behavior: Behavior normal.         Thought Content: Thought content normal.         Judgment: Judgment normal.         Assessment/Plan   Diagnoses and all orders for this visit:  Postpartum hypertension ([facility])  Blood pressure check  RTO virtually next week, in office the following week/ALEXIS Posey 09/27/24 2:07 PM

## 2024-09-29 ASSESSMENT — ENCOUNTER SYMPTOMS
MUSCULOSKELETAL NEGATIVE: 1
RESPIRATORY NEGATIVE: 1
CONSTITUTIONAL NEGATIVE: 1
EYES NEGATIVE: 1
HEMATOLOGIC/LYMPHATIC NEGATIVE: 1
NEUROLOGICAL NEGATIVE: 1
ENDOCRINE NEGATIVE: 1
CARDIOVASCULAR NEGATIVE: 1
GASTROINTESTINAL NEGATIVE: 1
ALLERGIC/IMMUNOLOGIC NEGATIVE: 1
SLEEP DISTURBANCE: 1

## 2024-09-30 ENCOUNTER — APPOINTMENT (OUTPATIENT)
Dept: OBSTETRICS AND GYNECOLOGY | Facility: CLINIC | Age: 34
End: 2024-09-30
Payer: COMMERCIAL

## 2024-10-03 ENCOUNTER — TELEMEDICINE (OUTPATIENT)
Dept: OBSTETRICS AND GYNECOLOGY | Facility: CLINIC | Age: 34
End: 2024-10-03
Payer: COMMERCIAL

## 2024-10-03 DIAGNOSIS — Z09 FOLLOW-UP EXAM: ICD-10-CM

## 2024-10-03 PROCEDURE — 99212 OFFICE O/P EST SF 10 MIN: CPT | Performed by: MIDWIFE

## 2024-10-03 NOTE — PROGRESS NOTES
Subjective   Patient ID: Marisol Rebolledo is a 34 y.o. female who presents for No chief complaint on file..  This 33yo presents via telephone with consent after difficulty connecting to virtual platform to discuss nifedipine changes. She decreased dose to 30mg once daily on Tuesdsay as previously planned. She has been monitoring Bps this week and notes they have been WNL since decreasing dose. She has noticed occasional flushing to her face and dizziness, it sounds like they are more frequent with position changes. She denies HA, blurred vision, chest or RUQ pain. She has no other complaints and denies concerns related to bleeding, urination, or defecation.     We discussed her BP may be dropping low and causing her symptoms of flushing and dizziness. We discussed safety precautions including increasing fluids and changing position slowly. She will discontinue nifedipine tomorrow and continue monitoring BP through visit next week. She will report any worsening symptoms, concerns, or abnormal BP values. She will keep her 6W appointment and bring her BP log with her. She is still considering contraception options.     Review of Systems   Constitutional: Negative.    HENT: Negative.     Eyes: Negative.    Respiratory: Negative.     Cardiovascular: Negative.    Endocrine: Negative.    Genitourinary: Negative.    Musculoskeletal: Negative.    Skin: Negative.    Allergic/Immunologic: Negative.    Neurological:  Positive for dizziness and light-headedness. Negative for seizures.   Hematological: Negative.    Psychiatric/Behavioral: Negative.         Objective - telephone visit with limited PE   Physical Exam  Neurological:      Mental Status: She is alert.     At no point during our telephone visit was there a concern to indicate an in office PE was necessary today.     Assessment/Plan   Diagnoses and all orders for this visit:  Postpartum hypertension ([facility])   - DC nifedipine  - continue monitoring BP    Follow-up  exam  RTO 1 week/PRN       ADRIANA Cooper-CORRINE 10/03/24 4:28 PM

## 2024-10-09 ASSESSMENT — ENCOUNTER SYMPTOMS
CARDIOVASCULAR NEGATIVE: 1
CONSTITUTIONAL NEGATIVE: 1
MUSCULOSKELETAL NEGATIVE: 1
LIGHT-HEADEDNESS: 1
PSYCHIATRIC NEGATIVE: 1
RESPIRATORY NEGATIVE: 1
DIZZINESS: 1
ALLERGIC/IMMUNOLOGIC NEGATIVE: 1
HEMATOLOGIC/LYMPHATIC NEGATIVE: 1
SEIZURES: 0
EYES NEGATIVE: 1
ENDOCRINE NEGATIVE: 1

## 2024-10-11 ENCOUNTER — APPOINTMENT (OUTPATIENT)
Dept: OBSTETRICS AND GYNECOLOGY | Facility: CLINIC | Age: 34
End: 2024-10-11
Payer: COMMERCIAL

## 2024-10-11 VITALS
DIASTOLIC BLOOD PRESSURE: 82 MMHG | HEIGHT: 70 IN | SYSTOLIC BLOOD PRESSURE: 133 MMHG | WEIGHT: 208 LBS | BODY MASS INDEX: 29.78 KG/M2

## 2024-10-11 DIAGNOSIS — K59.00 CONSTIPATION, UNSPECIFIED CONSTIPATION TYPE: ICD-10-CM

## 2024-10-11 PROCEDURE — 0503F POSTPARTUM CARE VISIT: CPT | Performed by: MIDWIFE

## 2024-10-11 ASSESSMENT — ENCOUNTER SYMPTOMS
NEUROLOGICAL NEGATIVE: 1
CONSTIPATION: 1
BLOOD IN STOOL: 0
EYES NEGATIVE: 1
NAUSEA: 0
VOMITING: 0
DIARRHEA: 0
ALLERGIC/IMMUNOLOGIC NEGATIVE: 1
MUSCULOSKELETAL NEGATIVE: 1
HEMATOLOGIC/LYMPHATIC NEGATIVE: 1
CARDIOVASCULAR NEGATIVE: 1
ENDOCRINE NEGATIVE: 1
PSYCHIATRIC NEGATIVE: 1
RESPIRATORY NEGATIVE: 1
CONSTITUTIONAL NEGATIVE: 1

## 2024-10-11 ASSESSMENT — EDINBURGH POSTNATAL DEPRESSION SCALE (EPDS)
I HAVE BEEN SO UNHAPPY THAT I HAVE HAD DIFFICULTY SLEEPING: NOT AT ALL
I HAVE BEEN ABLE TO LAUGH AND SEE THE FUNNY SIDE OF THINGS: AS MUCH AS I ALWAYS COULD
I HAVE LOOKED FORWARD WITH ENJOYMENT TO THINGS: AS MUCH AS I EVER DID
I HAVE BEEN SO UNHAPPY THAT I HAVE BEEN CRYING: NO, NEVER
I HAVE FELT SAD OR MISERABLE: NO, NOT AT ALL
I HAVE FELT SCARED OR PANICKY FOR NO GOOD REASON: NO, NOT AT ALL
THE THOUGHT OF HARMING MYSELF HAS OCCURRED TO ME: NEVER
THINGS HAVE BEEN GETTING ON TOP OF ME: NO, I HAVE BEEN COPING AS WELL AS EVER
I HAVE BEEN ANXIOUS OR WORRIED FOR NO GOOD REASON: NO, NOT AT ALL
TOTAL SCORE: 0
I HAVE BLAMED MYSELF UNNECESSARILY WHEN THINGS WENT WRONG: NO, NEVER

## 2024-10-11 NOTE — PROGRESS NOTES
Subjective   Patient ID: Marisol Rebolledo is a 34 y.o. female who presents for Postpartum Care (6 WEEK PP).  This 33yo presents at 6W PP following , delivery and recovery course c/b gestational hypertension with medications. She has been monitoring Bps and tapering medications, after some dizziness and flushing she discontinued medications altogether last Thursday. Blood pressures have been WNL and the dizziness and flushing have stopped. Today she is feeling well. She endorses effective coping and help at home. She is combination feeding with expressed breast milk and formula. She denies concerns related to urination but continues to have constipation unrelieved with fluids, Miralax, and Colace. She continues to notice varicose veins around her pelvic floor that she would like checked. She has used PT for this in the remote past and would like to return. She has not decided on contraception.     We reviewed GI issues and she was offered a GI referral, to which she declined and would like to start with PT and consider this later if needed. She is still considering Caya diaphragm and will call if a script is desired. Her questions were answered to her satisfaction.     Review of Systems   Constitutional: Negative.    HENT: Negative.     Eyes: Negative.    Respiratory: Negative.     Cardiovascular: Negative.    Gastrointestinal:  Positive for constipation. Negative for blood in stool, diarrhea, nausea and vomiting.   Endocrine: Negative.    Genitourinary: Negative.    Musculoskeletal: Negative.    Skin: Negative.    Allergic/Immunologic: Negative.    Neurological: Negative.    Hematological: Negative.    Psychiatric/Behavioral: Negative.         Objective   Physical Exam  Vitals and nursing note reviewed.   Constitutional:       Appearance: Normal appearance.   HENT:      Head: Normocephalic and atraumatic.      Right Ear: External ear normal.      Left Ear: External ear normal.      Nose: Nose normal.       Mouth/Throat:      Mouth: Mucous membranes are moist.      Pharynx: Oropharynx is clear.   Eyes:      Extraocular Movements: Extraocular movements intact.      Conjunctiva/sclera: Conjunctivae normal.      Pupils: Pupils are equal, round, and reactive to light.   Pulmonary:      Effort: Pulmonary effort is normal.   Abdominal:      Hernia: A hernia is present. There is no hernia in the left inguinal area or right inguinal area.   Genitourinary:     General: Normal vulva.      Labia:         Right: No rash, tenderness, lesion or injury.         Left: No rash, tenderness, lesion or injury.       Urethra: No prolapse, urethral pain, urethral swelling or urethral lesion.      Vagina: Normal.      Cervix: Normal.      Uterus: Normal.       Adnexa: Right adnexa normal and left adnexa normal.      Rectum: Normal.   Musculoskeletal:         General: Normal range of motion.   Lymphadenopathy:      Lower Body: No right inguinal adenopathy. No left inguinal adenopathy.   Skin:     General: Skin is warm and dry.   Neurological:      General: No focal deficit present.      Mental Status: She is alert and oriented to person, place, and time. Mental status is at baseline.   Psychiatric:         Mood and Affect: Mood normal.         Behavior: Behavior normal.         Thought Content: Thought content normal.         Judgment: Judgment normal.       Assessment/Plan   Diagnoses and all orders for this visit:  Postpartum hypertension ([facility])  Constipation, unspecified constipation type  -     Referral to Physical Therapy; Future  Postpartum care and examination ([facility])  RTO 3 months for annual/PRN        ALEXIS Cooper 10/11/24 1:21 PM

## 2024-10-30 RX ORDER — NIFEDIPINE 30 MG/1
TABLET, EXTENDED RELEASE ORAL
Qty: 60 TABLET | Refills: 0 | OUTPATIENT
Start: 2024-10-30

## 2024-12-06 ENCOUNTER — TELEPHONE (OUTPATIENT)
Dept: PRIMARY CARE | Facility: CLINIC | Age: 34
End: 2024-12-06
Payer: COMMERCIAL

## 2024-12-06 NOTE — TELEPHONE ENCOUNTER
Her inhaler is at ge its 55 but the last time you got it down to 13 with a coupon you printed out for her she's out. Can you get her another coupon for it

## 2024-12-06 NOTE — TELEPHONE ENCOUNTER
She can go on to [employer] and print it out herself actually if that is easier so she does not have to run in here

## 2025-01-17 ENCOUNTER — APPOINTMENT (OUTPATIENT)
Dept: OBSTETRICS AND GYNECOLOGY | Facility: CLINIC | Age: 35
End: 2025-01-17
Payer: COMMERCIAL

## 2025-03-27 ENCOUNTER — APPOINTMENT (OUTPATIENT)
Dept: OBSTETRICS AND GYNECOLOGY | Facility: CLINIC | Age: 35
End: 2025-03-27

## 2025-03-28 ENCOUNTER — APPOINTMENT (OUTPATIENT)
Dept: OBSTETRICS AND GYNECOLOGY | Facility: CLINIC | Age: 35
End: 2025-03-28

## 2025-03-28 VITALS
BODY MASS INDEX: 29.63 KG/M2 | DIASTOLIC BLOOD PRESSURE: 69 MMHG | HEIGHT: 70 IN | SYSTOLIC BLOOD PRESSURE: 112 MMHG | WEIGHT: 207 LBS

## 2025-03-28 DIAGNOSIS — Z01.419 WELL WOMAN EXAM: Primary | ICD-10-CM

## 2025-03-28 DIAGNOSIS — Z87.2 HISTORY OF SKIN PRURITUS: ICD-10-CM

## 2025-03-28 PROCEDURE — 3074F SYST BP LT 130 MM HG: CPT | Performed by: MIDWIFE

## 2025-03-28 PROCEDURE — 99395 PREV VISIT EST AGE 18-39: CPT | Performed by: MIDWIFE

## 2025-03-28 PROCEDURE — 3008F BODY MASS INDEX DOCD: CPT | Performed by: MIDWIFE

## 2025-03-28 PROCEDURE — 3078F DIAST BP <80 MM HG: CPT | Performed by: MIDWIFE

## 2025-03-28 ASSESSMENT — ENCOUNTER SYMPTOMS
NEUROLOGICAL NEGATIVE: 1
CONSTITUTIONAL NEGATIVE: 1
ALLERGIC/IMMUNOLOGIC NEGATIVE: 1
ENDOCRINE NEGATIVE: 1
RESPIRATORY NEGATIVE: 1
EYES NEGATIVE: 1

## 2025-03-28 NOTE — PROGRESS NOTES
Subjective   Patient ID: Marisol Rebolledo is a 34 y.o. female who presents for Gynecologic Exam.  This 35yo presents for an annual exam. She has a swollen gland on her LEFT breast that is sometimes painful and she would like this rechecked. Periods returned about 3 months ago and have been normal length with a moderately heavy flow and cramping. She denies breast, vaginal, or urinary discomfort and declines STD testing.     Gynecologic Exam    Next PAP due 2027.     Healthy lifestyle topics reinforced re: diet, activity, supplements, BMI, tobacco avoidance, annual exams, and screenings. She denies concerns for mental health, substance use, or safety at home.      She is concerned for athlete's foot and/or nail fungus to her LEFT foot. She requested a medication, we reviewed using a topical antifungal spray and follow up with PCP vs dermatology for unrelieved concerns.     Review of Systems   Constitutional: Negative.    HENT: Negative.     Eyes: Negative.    Respiratory: Negative.     Cardiovascular: Negative.    Gastrointestinal: Negative.    Endocrine: Negative.    Genitourinary: Negative.    Musculoskeletal: Negative.    Skin:         Itching and redness to foot    Allergic/Immunologic: Negative.    Neurological: Negative.    Hematological: Negative.    Psychiatric/Behavioral: Negative.         Objective   Physical Exam  Vitals and nursing note reviewed.   Constitutional:       Appearance: Normal appearance. She is normal weight.   HENT:      Head: Normocephalic and atraumatic.      Right Ear: Tympanic membrane, ear canal and external ear normal.      Left Ear: Tympanic membrane, ear canal and external ear normal.      Nose: Nose normal.      Mouth/Throat:      Mouth: Mucous membranes are moist.      Pharynx: Oropharynx is clear.   Eyes:      Extraocular Movements: Extraocular movements intact.      Conjunctiva/sclera: Conjunctivae normal.      Pupils: Pupils are equal, round, and reactive to light.   Neck:       Thyroid: No thyromegaly.   Cardiovascular:      Rate and Rhythm: Normal rate and regular rhythm.      Pulses: Normal pulses.      Heart sounds: Normal heart sounds.   Pulmonary:      Effort: Pulmonary effort is normal.      Breath sounds: Normal breath sounds.   Chest:   Breasts:     Right: Normal.      Left: Normal.          Comments: Red: hard aguillon tubercle vs small cyst, no erythema, drainage, or breast involvement   Abdominal:      General: Abdomen is flat. Bowel sounds are normal.      Palpations: Abdomen is soft.      Hernia: There is no hernia in the left inguinal area or right inguinal area.   Genitourinary:     General: Normal vulva.      Pubic Area: No rash.       Labia:         Right: No rash, tenderness, lesion or injury.         Left: No rash, tenderness, lesion or injury.       Urethra: No prolapse, urethral pain, urethral swelling or urethral lesion.      Vagina: Normal.      Cervix: No cervical motion tenderness.      Uterus: Normal.       Adnexa: Right adnexa normal and left adnexa normal.      Rectum: Normal.   Musculoskeletal:         General: Normal range of motion.      Cervical back: Full passive range of motion without pain, normal range of motion and neck supple. No edema.      Right lower leg: No edema.      Left lower leg: No edema.   Lymphadenopathy:      Cervical: No cervical adenopathy.      Upper Body:      Right upper body: No supraclavicular, axillary or pectoral adenopathy.      Left upper body: No supraclavicular, axillary or pectoral adenopathy.      Lower Body: No right inguinal adenopathy. No left inguinal adenopathy.   Skin:     General: Skin is warm and dry.   Neurological:      General: No focal deficit present.      Mental Status: She is alert and oriented to person, place, and time. Mental status is at baseline.      Cranial Nerves: Cranial nerves 2-12 are intact.      Coordination: Coordination is intact.      Gait: Gait is intact.   Psychiatric:         Mood and  Affect: Mood normal.         Behavior: Behavior normal.         Thought Content: Thought content normal.         Judgment: Judgment normal.     Breast exam notes <3mm hard, circular lump at site of Bo tubercle. It first developed postpartum and has not resolved since she weaned from breastfeeding. She notes it is not painful unless she is touching it, there is no associated drainage or inflammation. We discussed monitoring for s/s infection or developing pain, and the options to attempt warm compresses vs physician consult to evaluate for I&D vs surgical removal. At this time, she is not interested in further consult and will continue to monitor.     Assessment/Plan   Diagnoses and all orders for this visit:  Well woman exam  History of skin pruritus  -     clotrimazole 1 % spray,non-aerosol; Apply 1 spray topically 2 times a day for 14 days.  RTO 1 year/PRN       ALEXIS Cooper 03/28/25 2:13 PM

## 2025-03-29 ASSESSMENT — ENCOUNTER SYMPTOMS
GASTROINTESTINAL NEGATIVE: 1
CARDIOVASCULAR NEGATIVE: 1
PSYCHIATRIC NEGATIVE: 1
HEMATOLOGIC/LYMPHATIC NEGATIVE: 1
MUSCULOSKELETAL NEGATIVE: 1

## 2025-04-01 ENCOUNTER — TELEPHONE (OUTPATIENT)
Dept: OBSTETRICS AND GYNECOLOGY | Facility: CLINIC | Age: 35
End: 2025-04-01
Payer: COMMERCIAL

## 2025-04-01 NOTE — TELEPHONE ENCOUNTER
[facility] called stating that they do not have the clotrimazole 1% spray, but they do have 1% cream or 1% solution. If you would like to switch the prescription to one of those available then they said to just update the script and re-send it.

## 2025-04-02 DIAGNOSIS — Z87.2 HISTORY OF SKIN PRURITUS: ICD-10-CM

## 2025-04-02 RX ORDER — CLOTRIMAZOLE 1 G/ML
SOLUTION TOPICAL 2 TIMES DAILY
Qty: 30 ML | Refills: 0 | Status: SHIPPED | OUTPATIENT
Start: 2025-04-02 | End: 2025-04-16

## 2025-04-02 NOTE — PROGRESS NOTES
Pharmacy requested alternative formulation as they do not have spray in stock. Changed to solution.     JIMBO ESPINOZA

## 2025-04-28 ENCOUNTER — TELEPHONE (OUTPATIENT)
Dept: FAMILY MEDICINE CLINIC | Age: 35
End: 2025-04-28

## 2025-04-28 NOTE — TELEPHONE ENCOUNTER
----- Message from Geraldine RAMIREZ sent at 4/28/2025  1:08 PM EDT -----  Regarding: [facility] Appointment Request  [facility] Appointment Request    Patient needs appointment for [facility] Appointment Type: New Patient.    Patient Requested Dates(s):Monday-friday  Patient Requested Time:afternoon  Provider Name:Precious Mackay, JESSIE-ELDER    Reason for Appointment Request: New Patient - Available appointments did not meet patient need/need soonest   --------------------------------------------------------------------------------------------------------------------------    Relationship to Patient: Self     Call Back Information: OK to leave message on voicemail  Preferred Call Back Number: Phone [phone]

## 2025-04-28 NOTE — TELEPHONE ENCOUNTER
Advised Nasra that we can add her name to our new patient list and call her when we resume scheduling new patient appointments.

## 2025-04-29 ENCOUNTER — OFFICE VISIT (OUTPATIENT)
Dept: PRIMARY CARE | Facility: CLINIC | Age: 35
End: 2025-04-29
Payer: COMMERCIAL

## 2025-04-29 VITALS
BODY MASS INDEX: 30.05 KG/M2 | HEART RATE: 97 BPM | DIASTOLIC BLOOD PRESSURE: 68 MMHG | WEIGHT: 209.4 LBS | SYSTOLIC BLOOD PRESSURE: 128 MMHG | OXYGEN SATURATION: 99 %

## 2025-04-29 DIAGNOSIS — B35.1 TOENAIL FUNGUS: ICD-10-CM

## 2025-04-29 DIAGNOSIS — M25.561 CHRONIC PAIN OF RIGHT KNEE: Primary | ICD-10-CM

## 2025-04-29 DIAGNOSIS — B35.3 TINEA PEDIS OF BOTH FEET: ICD-10-CM

## 2025-04-29 DIAGNOSIS — G89.29 CHRONIC PAIN OF RIGHT KNEE: Primary | ICD-10-CM

## 2025-04-29 PROCEDURE — 1036F TOBACCO NON-USER: CPT

## 2025-04-29 PROCEDURE — 3078F DIAST BP <80 MM HG: CPT

## 2025-04-29 PROCEDURE — 3074F SYST BP LT 130 MM HG: CPT

## 2025-04-29 PROCEDURE — 99214 OFFICE O/P EST MOD 30 MIN: CPT

## 2025-04-29 RX ORDER — CICLOPIROX 80 MG/ML
SOLUTION TOPICAL NIGHTLY
Qty: 6.6 ML | Refills: 0 | Status: SHIPPED | OUTPATIENT
Start: 2025-04-29

## 2025-04-29 ASSESSMENT — PATIENT HEALTH QUESTIONNAIRE - PHQ9
SUM OF ALL RESPONSES TO PHQ9 QUESTIONS 1 AND 2: 0
1. LITTLE INTEREST OR PLEASURE IN DOING THINGS: NOT AT ALL
2. FEELING DOWN, DEPRESSED OR HOPELESS: NOT AT ALL

## 2025-04-29 NOTE — PROGRESS NOTES
Subjective   Patient ID: Marisol Rebolledo is a 34 y.o. female who presents for Leg Pain (R leg pain around knee, has hx of ), Nail Problem (F foot has fungus on big toe.     Both feet athlete foot? ), and Mouth Lesions (Bottom lip).  HPI  R leg pain   - 5-10 yrs ago   - sees chiropractor  - previous MRI showed cyst and torn meniscus   - did physical therapy to shrink it but didnt have money to get the MRI repeated  - pain is now repeating, started yesterday  - shooting pain in the middle of her knee right under the knee cap   - has trouble going downstairs, bending down   - feels like it is bone on bone  - very sharp pain   - cut the grass and picked up sticks and weeding yesterday. Didnt feel anything twist or pop.    Athletes foot- has used clotrimizole spray     Toenail fungus-- used cicloproix in the past     Has had a white bump in the inside of her bottom lip. Can get really big and painful and then gets small again.     Current Medications[1]   Surgical History[2]   Medical History[3]  Social History[4]   Family History[5]   Review of Systems  10 point ROS negative except as otherwise noted in the HPI.      Objective   /68   Pulse 97   Wt 95 kg (209 lb 6.4 oz)   SpO2 99%   BMI 30.05 kg/m²    Physical Exam  Constitutional:       Appearance: Normal appearance.   HENT:      Head: Normocephalic and atraumatic.      Mouth/Throat:      Comments: Small white bump on inside of lower lip   Eyes:      Extraocular Movements: Extraocular movements intact.      Pupils: Pupils are equal, round, and reactive to light.   Cardiovascular:      Rate and Rhythm: Normal rate and regular rhythm.      Pulses: Normal pulses.      Heart sounds: Normal heart sounds.   Pulmonary:      Effort: Pulmonary effort is normal.      Breath sounds: Normal breath sounds.   Musculoskeletal:         General: Normal range of motion.      Right knee: Bony tenderness present. No swelling, deformity, effusion or erythema. Tenderness present  over the lateral joint line and patellar tendon. No LCL laxity, MCL laxity, ACL laxity or PCL laxity.      Instability Tests: Anterior drawer test negative. Posterior drawer test negative. Anterior Lachman test negative. Medial Ira test negative and lateral Ira test negative.      Left knee: Normal.      Right lower leg: Normal. No edema.      Left lower leg: Normal. No edema.      Comments: No significant redness, swelling, fluid sacs   Feet:      Right foot:      Toenail Condition: Fungal disease present.     Left foot:      Toenail Condition: Fungal disease present.     Comments: Some mild scaling skin on soles of feet near toes  Skin:     General: Skin is warm and dry.      Findings: No rash.   Neurological:      General: No focal deficit present.      Mental Status: She is alert and oriented to person, place, and time.   Psychiatric:         Mood and Affect: Mood normal.         Behavior: Behavior normal.           Assessment/Plan   Problem List Items Addressed This Visit    None  Visit Diagnoses         Chronic pain of right knee    -  Primary  - Pt w ongoing chronic R knee pain. Hurts to bend her knee, go downstairs, tender to touch over knee.  - formerly had R meniscus injury on MRI and did PT.   -repeat MRI R knee  - PT ordered  - ice, rest, tylenol/ibuprofen     Relevant Orders    MR knee right wo IV contrast    Referral to Physical Therapy      Tinea pedis of both feet        Relevant Medications    clotrimazole 1 % spray,non-aerosol      Toenail fungus        Relevant Medications    ciclopirox (Penlac) 8 % solution            Discussed at visit any disease processes that were of concern as well as the risks, benefits and instructions on any new medication provided. Patient (and/or caretaker of patient if present) stated all questions were answered, and they voiced understanding of instructions.     Janice Louis PA-C          [1]   Current Outpatient Medications:     budesonide-formoteroL  (Symbicort) 160-4.5 mcg/actuation inhaler, Inhale 1 puff 2 times a day. Rinse mouth with water after use to reduce aftertaste and incidence of candidiasis. Do not swallow., Disp: 10.2 g, Rfl: 11    blood pressure monitor kit, 1 Device 2 times a day. Take BP twice daily (Patient not taking: Reported on 4/29/2025), Disp: 1 kit, Rfl: 0    ciclopirox (Penlac) 8 % solution, Apply topically once daily at bedtime. Apply to affected nail daily and wipe off with alcohol every 7 days. Continue until resolved. Maximum of 48 weeks., Disp: 6.6 mL, Rfl: 0    clotrimazole 1 % spray,non-aerosol, Spray on affected twice daily until 1 week after resolution, Disp: 60 mL, Rfl: 1  [2]   Past Surgical History:  Procedure Laterality Date    OTHER SURGICAL HISTORY  12/03/2014    Alveoloplasty With Tooth Extraction 1-3 Teeth Per Quadrant    OTHER SURGICAL HISTORY  07/30/2020    Endoscopy   [3]   Past Medical History:  Diagnosis Date    Acute maxillary sinusitis, unspecified 06/09/2016    Acute maxillary sinusitis    Acute upper respiratory infection, unspecified 10/28/2019    Viral URI with cough    Asthma     Breast tenderness in female 05/15/2023    Chest pain 05/15/2023    Dizziness 05/15/2023    Facial flushing 05/15/2023    Heavy periods 05/15/2023    Hordeolum externum left lower eyelid 06/10/2019    Hordeolum externum of left lower eyelid    Left ear pain 05/15/2023    Left flank pain 05/15/2023    Mild intermittent asthma with exacerbation ([facility]) 05/15/2023    Onychomycosis of toenail 05/15/2023    Other conditions influencing health status     No significant past medical history    Other specified disorders of eustachian tube, left ear 03/14/2020    Dysfunction of left eustachian tube    Pectoralis muscle strain 05/15/2023    Pelvic and perineal pain 04/09/2019    Pelvic pain in female    Personal history of other diseases of the respiratory system     History of sore throat    Personal history of other specified conditions  04/09/2019    History of abdominal pain    Pes planus of left foot 05/15/2023    Pneumonia, unspecified organism 03/01/2016    Community acquired pneumonia    Shortness of breath 05/15/2023    Spasm of muscle 11/13/2019    Unspecified nonsuppurative otitis media, unspecified ear 01/14/2019    Middle ear effusion    Vaginal discharge 05/15/2023    Vocal nodules in adults     Yeast infection 05/15/2023   [4]   Social History  Tobacco Use    Smoking status: Never    Smokeless tobacco: Never   Vaping Use    Vaping status: Never Used   Substance Use Topics    Alcohol use: Not Currently     Alcohol/week: 1.0 standard drink of alcohol     Types: 1 Glasses of wine per week    Drug use: Never   [5]   Family History  Problem Relation Name Age of Onset    Hyperlipidemia Mother      Hypertension Mother      Diabetes Father      Retinoblastoma Mother's Sister      Skin cancer Mother's Sister      Other (prediabetes) Maternal Grandmother      Brain Aneurysm Maternal Grandfather      Other (heart valve issues) Maternal Grandfather      Other (fatty liver) Maternal Grandfather      Diabetes Paternal Grandmother      Heart attack Paternal Grandmother      Spina bifida Cousin      Cancer Cousin      Breast cancer Maternal Great-Grandmother

## 2025-05-09 ENCOUNTER — HOSPITAL ENCOUNTER (OUTPATIENT)
Dept: RADIOLOGY | Facility: CLINIC | Age: 35
Discharge: HOME | End: 2025-05-09
Payer: COMMERCIAL

## 2025-05-09 DIAGNOSIS — M25.561 CHRONIC PAIN OF RIGHT KNEE: ICD-10-CM

## 2025-05-09 DIAGNOSIS — G89.29 CHRONIC PAIN OF RIGHT KNEE: ICD-10-CM

## 2025-05-09 PROCEDURE — 73721 MRI JNT OF LWR EXTRE W/O DYE: CPT | Mod: RT

## 2025-05-12 DIAGNOSIS — M87.9: Primary | ICD-10-CM

## 2025-05-12 NOTE — TELEPHONE ENCOUNTER
I am aware of her asthma, however, moving forward we can not manage that. She will need to see her PCP. I was updated with concerns though and I hope she is feeling better. If she doesn't have a PCP, I would recommending finding one as this could worsen during the pregnancy or we can sent her for a consult in Pulmonology.   Portia [10] : 10 [Dull/Aching] : dull/aching [Localized] : localized [Sharp] : sharp [Intermittent] : intermittent [Nothing helps with pain getting better] : Nothing helps with pain getting better [Exercising] : exercising [Not working due to injury] : Work status: not working due to injury [] : yes [de-identified] : WC DOI 11/8/24 2/24/2025: pt is here following up on neck and lower back. had an EMG and was told she has severe carpal tunnel in ash wrists, is following up with hand specialist. started PT last week. no sig change.   01/20/2025:  MAYURI JI a 61 year old RHD  female presents today for neck and lower back pain injured while at work. 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 Pain is to the right side of the back and shoots down the leg.  She felt left sided neck pain a few days later.  Down both arms - more on the right - The pain is constant.  Feels numbness to both hands and loss of fine motion motions to the right hand, limited motion to the right hand.   Using cane for balance. Notes some incontinence.  Had an old hand injury but its worse after the recent injury   [facility] where PT [facility] - hasnt started yet  MRI's done Taking gabapentin 300mg nightly and cyclobenzaprine 5mg as needed No acupuncture/chirocare No LESI  No prior neck surgery  L4 laminectomy 2017 (Dr Hansen [facility])  Pmhx: anxeity, on weight loss medication (wegovy)  Pshx:  Mensicus repair right knee in dec 2024 Hysterectomy 2007  No hx diabetes/cancer  Occupation:  at [employer] - she worked until 12/4 then out since then   xrays today L-spine 2v - slight spondylolisthesis at L4-5, mild spondylosis  AP pelvis - negative  C-spine 2v - degnerative changes C5-6 disc   MRIs from [facility]- reports MRI L-spine - prior L4-5 decompression   MRI C-spine - 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 worrried about the hand not able to return to work  pending hand visit  started with PT"  Pain remains mostly in the lower back on the right side down the side of the right leg  Neck pain remains   In PT without relief   MRi C spine - stenosis worst from C4-6 MRi L spine - right sided NF stenosis at L4-5, prior deocmpression at Left sided L4-5  see reports in chart   ----------------------------------------------------  5/12/25:  Here for fu on the neck and lower back; pain exacerbated to the right side of the lower back that radiates to the mid thigh. She has been attending PT. She has been seeing pain management and will now try Lyrica.   Not working  reviewed the last MRi was from November  Had a severe pain flare up in March [de-identified] :

## 2025-05-14 ENCOUNTER — TELEPHONE (OUTPATIENT)
Dept: OBSTETRICS AND GYNECOLOGY | Facility: CLINIC | Age: 35
End: 2025-05-14
Payer: COMMERCIAL

## 2025-05-14 NOTE — TELEPHONE ENCOUNTER
"Spoke with pt, she reports 2 weeks ago she had a R knee injury while hiking and was overcompensating with her left leg and has since noticed some pain that feels like sciatica near left buttocks, and feels like she is having pelvic floor dysfunction as well. She feels like a muscle is torn in her pelvic floor, constant dull ache extends from pelvic floor up through her back to her kidney. Pt also reports that she feels \"swollen\" when voiding her bladder near her left labia, and feels some resistance as urine comes out. Denies urinary frequency/urgency/fever/burning with urination. Denies hx of UTIs and kidney stones. Pt has a lot of life stressors right now and currently feels overwhelmed. She is requesting an appointment for evaluation.   "

## 2025-05-15 ENCOUNTER — OFFICE VISIT (OUTPATIENT)
Dept: OBSTETRICS AND GYNECOLOGY | Facility: CLINIC | Age: 35
End: 2025-05-15
Payer: COMMERCIAL

## 2025-05-15 VITALS — DIASTOLIC BLOOD PRESSURE: 78 MMHG | SYSTOLIC BLOOD PRESSURE: 110 MMHG | BODY MASS INDEX: 30.13 KG/M2 | WEIGHT: 210 LBS

## 2025-05-15 DIAGNOSIS — N89.8 VAGINAL DISCHARGE: Primary | ICD-10-CM

## 2025-05-15 DIAGNOSIS — R10.2 PELVIC PAIN: ICD-10-CM

## 2025-05-15 DIAGNOSIS — K59.00 CONSTIPATION, UNSPECIFIED CONSTIPATION TYPE: ICD-10-CM

## 2025-05-15 PROCEDURE — 99213 OFFICE O/P EST LOW 20 MIN: CPT | Performed by: MIDWIFE

## 2025-05-15 PROCEDURE — 3078F DIAST BP <80 MM HG: CPT | Performed by: MIDWIFE

## 2025-05-15 PROCEDURE — 3074F SYST BP LT 130 MM HG: CPT | Performed by: MIDWIFE

## 2025-05-15 PROCEDURE — 1036F TOBACCO NON-USER: CPT | Performed by: MIDWIFE

## 2025-05-15 SDOH — ECONOMIC STABILITY: FOOD INSECURITY: WITHIN THE PAST 12 MONTHS, THE FOOD YOU BOUGHT JUST DIDN'T LAST AND YOU DIDN'T HAVE MONEY TO GET MORE.: NEVER TRUE

## 2025-05-15 SDOH — ECONOMIC STABILITY: FOOD INSECURITY: WITHIN THE PAST 12 MONTHS, YOU WORRIED THAT YOUR FOOD WOULD RUN OUT BEFORE YOU GOT MONEY TO BUY MORE.: NEVER TRUE

## 2025-05-15 SDOH — ECONOMIC STABILITY: TRANSPORTATION INSECURITY
IN THE PAST 12 MONTHS, HAS THE LACK OF TRANSPORTATION KEPT YOU FROM MEDICAL APPOINTMENTS OR FROM GETTING MEDICATIONS?: NO

## 2025-05-15 SDOH — ECONOMIC STABILITY: TRANSPORTATION INSECURITY
IN THE PAST 12 MONTHS, HAS LACK OF TRANSPORTATION KEPT YOU FROM MEETINGS, WORK, OR FROM GETTING THINGS NEEDED FOR DAILY LIVING?: NO

## 2025-05-15 ASSESSMENT — ENCOUNTER SYMPTOMS
FREQUENCY: 0
ENDOCRINE NEGATIVE: 1
VOMITING: 0
RESPIRATORY NEGATIVE: 1
DIFFICULTY URINATING: 0
CONSTIPATION: 1
NEUROLOGICAL NEGATIVE: 1
EYES NEGATIVE: 1
RECTAL PAIN: 0
PSYCHIATRIC NEGATIVE: 1
ALLERGIC/IMMUNOLOGIC NEGATIVE: 1
ANAL BLEEDING: 0
CONSTITUTIONAL NEGATIVE: 1
FEVER: 0
HEMATOLOGIC/LYMPHATIC NEGATIVE: 1
CARDIOVASCULAR NEGATIVE: 1
HEMATURIA: 0
NAUSEA: 0
BLOOD IN STOOL: 0
MUSCULOSKELETAL NEGATIVE: 1
DYSURIA: 0
DIARRHEA: 0

## 2025-05-15 NOTE — PROGRESS NOTES
"Subjective   Patient ID: Marisol Rebolledo is a 34 y.o. female who presents for Pelvic Pain (Pelvic issue).  This 35yo presents for new onset pelvic pain. It started this week in her LEFT sciatic nerve and buttocks, it wrapped around her hip and thigh. She also noticed her LEFT labia have been intermittently swollen and she is having some LLQ pain, especially during intercourse. She notes the pain is similar to when she had a fecal impaction and she started using Colace and Miralax yesterday. She denies burning with urination, but that things \"feel loose and uncomfortable\" with urination and she would like to r/o UTI. She denies fever or back pain. She has noticed some clear vaginal discharge but no irregular bleeding, rashes, odor, itching, etc. And no concerns for STD exposure.     Pelvic Pain  The patient's primary symptoms include pelvic pain and vaginal discharge. Associated symptoms include constipation. Pertinent negatives include no diarrhea, dysuria, fever, frequency, hematuria, nausea, urgency or vomiting.       Review of Systems   Constitutional: Negative.  Negative for fever.   HENT: Negative.     Eyes: Negative.    Respiratory: Negative.     Cardiovascular: Negative.    Gastrointestinal:  Positive for constipation. Negative for anal bleeding, blood in stool, diarrhea, nausea, rectal pain and vomiting.   Endocrine: Negative.    Genitourinary:  Positive for dyspareunia, pelvic pain, vaginal discharge and vaginal pain. Negative for decreased urine volume, difficulty urinating, dysuria, frequency, hematuria, menstrual problem, urgency and vaginal bleeding.   Musculoskeletal: Negative.         LEFT hip/sciatica pain   Skin: Negative.    Allergic/Immunologic: Negative.    Neurological: Negative.    Hematological: Negative.    Psychiatric/Behavioral: Negative.         Objective   Physical Exam  Vitals and nursing note reviewed.   Constitutional:       Appearance: Normal appearance.   HENT:      Head: " Normocephalic and atraumatic.      Right Ear: External ear normal.      Left Ear: External ear normal.      Nose: Nose normal.      Mouth/Throat:      Mouth: Mucous membranes are moist.      Pharynx: Oropharynx is clear.   Eyes:      Extraocular Movements: Extraocular movements intact.      Conjunctiva/sclera: Conjunctivae normal.      Pupils: Pupils are equal, round, and reactive to light.   Abdominal:      General: Abdomen is flat. Bowel sounds are normal.      Palpations: Abdomen is soft.      Hernia: There is no hernia in the left inguinal area or right inguinal area.   Genitourinary:     General: Normal vulva.      Labia:         Right: No rash, tenderness, lesion or injury.         Left: No rash, tenderness, lesion or injury.       Urethra: No prolapse, urethral pain, urethral swelling or urethral lesion.      Vagina: No signs of injury and foreign body. Vaginal discharge present. No erythema, tenderness, bleeding, lesions or prolapsed vaginal walls.      Cervix: Normal.      Uterus: Normal.       Adnexa: Right adnexa normal.        Left: Tenderness present. No mass or fullness.        Rectum: Normal.      Comments: Gram stain collected  Musculoskeletal:         General: Normal range of motion.   Lymphadenopathy:      Lower Body: No right inguinal adenopathy. No left inguinal adenopathy.   Skin:     General: Skin is warm and dry.   Neurological:      General: No focal deficit present.      Mental Status: She is alert and oriented to person, place, and time. Mental status is at baseline.   Psychiatric:         Mood and Affect: Mood normal.         Behavior: Behavior normal.         Thought Content: Thought content normal.         Judgment: Judgment normal.     Posterior LLQ pain noted with bimanual exam, pt notes last BM about 5 or 6 days ago. We reviewed potential causes and related interventions for pain, gram stain and urine culture collected today and she would like an updated referral to pelvic floor  therapy. She is also scheduled to see her chiropractor and is having some RIGHT knee symptoms evaluated. We discussed the potential for pelvic US, though she is comfortable pausing this until other things have been evaluated. She will call with any new/worsening symptoms in the meantime.      Assessment/Plan   Diagnoses and all orders for this visit:  Vaginal discharge  -     Vaginitis Gram Stain For Bacterial Vaginosis + Yeast  Pelvic pain  -     Urine culture  -     Referral to Physical Therapy; Future  Constipation, unspecified constipation type  -     Referral to Physical Therapy; Future  RTO as scheduled for annual/PRN otherwise        ADRIANA Cooper-CORRINE 05/15/25 3:41 PM

## 2025-05-17 LAB
BACTERIA UR CULT: NORMAL
BV SCORE VAG QL: NORMAL

## 2025-05-21 ENCOUNTER — TELEPHONE (OUTPATIENT)
Dept: PRIMARY CARE | Facility: CLINIC | Age: 35
End: 2025-05-21

## 2025-05-21 ENCOUNTER — HOSPITAL ENCOUNTER (OUTPATIENT)
Dept: RADIOLOGY | Facility: HOSPITAL | Age: 35
Discharge: HOME | End: 2025-05-21
Payer: COMMERCIAL

## 2025-05-21 ENCOUNTER — OFFICE VISIT (OUTPATIENT)
Dept: ORTHOPEDIC SURGERY | Facility: HOSPITAL | Age: 35
End: 2025-05-21
Payer: COMMERCIAL

## 2025-05-21 VITALS — WEIGHT: 210 LBS | BODY MASS INDEX: 30.06 KG/M2 | HEIGHT: 70 IN

## 2025-05-21 DIAGNOSIS — M25.561 RIGHT KNEE PAIN, UNSPECIFIED CHRONICITY: ICD-10-CM

## 2025-05-21 DIAGNOSIS — M25.561 PAIN OF RIGHT PATELLOFEMORAL JOINT: Primary | ICD-10-CM

## 2025-05-21 DIAGNOSIS — M87.9: ICD-10-CM

## 2025-05-21 DIAGNOSIS — M22.2X1 PATELLOFEMORAL PAIN SYNDROME OF RIGHT KNEE: ICD-10-CM

## 2025-05-21 DIAGNOSIS — M94.261 CHONDROMALACIA, KNEE, RIGHT: ICD-10-CM

## 2025-05-21 PROCEDURE — 3008F BODY MASS INDEX DOCD: CPT | Performed by: ORTHOPAEDIC SURGERY

## 2025-05-21 PROCEDURE — 99204 OFFICE O/P NEW MOD 45 MIN: CPT | Performed by: ORTHOPAEDIC SURGERY

## 2025-05-21 PROCEDURE — 73564 X-RAY EXAM KNEE 4 OR MORE: CPT | Mod: RT

## 2025-05-21 PROCEDURE — 1036F TOBACCO NON-USER: CPT | Performed by: ORTHOPAEDIC SURGERY

## 2025-05-21 PROCEDURE — 73564 X-RAY EXAM KNEE 4 OR MORE: CPT | Mod: RIGHT SIDE | Performed by: RADIOLOGY

## 2025-05-21 ASSESSMENT — PAIN SCALES - GENERAL: PAINLEVEL_OUTOF10: 1

## 2025-05-21 ASSESSMENT — PAIN - FUNCTIONAL ASSESSMENT: PAIN_FUNCTIONAL_ASSESSMENT: 0-10

## 2025-05-21 NOTE — PATIENT INSTRUCTIONS
BMI was above normal measurement. Current weight: 95.3 kg (210 lb)  Weight change since last visit (-) denotes wt loss 0 lbs   Weight loss needed to achieve BMI 25: 36.1 Lbs  Weight loss needed to achieve BMI 30: 1.4 Lbs  Advised to Increase physical activity.

## 2025-05-21 NOTE — PROGRESS NOTES
34-year-old female with on and off pain in the right knee.  The patient was first diagnosed with pain in the right knee about 6 years ago and had an MRI at that time.  She started getting pain 1 to 2 months ago again but states the pain is entire limb from the hip down to the ankle.  She points mostly to the anterior aspect of the knee as the source of the pain but states the pain is everywhere throughout her knee.  She is here to review the results of her recent MRI    Patients' self reported past medical history, medications, allergies, surgical history, family and social history as well as a 10 point review of systems has been documented in the new patient intake form and scanned into the patient's electronic medical record.  The intake form was reviewed by Dr Stubbs during the office visit and signed by Dr. Stubbs and the patient.  Pertinent findings are documented in the HPI.    General Multi-System Physical Exam:  Constitutional  General appearance:  Alert, oriented, and in no acute distress.  Well developed, well nourished.  Head and Face  Head and face:  Normocephalic and atraumatic.  Ears, Nose, Mouth, and Throat  External inspection of ears and nose: Normal.  Eyes:  Pupils are equal and round.  Neck  Neck:  no neck mass was observed.  Pulmonary  Respiratory effort:  no respiratory distress.  Cardiovascular  Intact distal pulses.  Lymphatic  Palpation of lymph nodes in the affected extremity:  Normal.  Skin  Skin and subcutaneous tissue:  Normal skin color and pigmentation.  Normal skin turgor.  No rashes.  Neurologic  Sensation:  normal to light touch.  Psychiatric  Judgement and insight:  Intact.  Mood and affect:  Normal.  Musculoskeletal  Patient has flat feet with an intoeing gait.  She has positive patellar grind positive medial and lateral joint line tenderness with full range of motion no effusion in the knee.  Patient has a negative Lockman exam, negative anterior and negative posterior drawer. The  knee is stable to varus and valgus stress without pain. Patient is neurovascularly intact in the bilateral lower extremities.    X-rays of the patient were ordered by Dr Stubbs and obtained today.  Dr Stubbs personally reviewed the results of the x-rays.    In addition, Dr Stubbs independently interpreted the patient's x-rays (performed by the Radiology department) by viewing the x-ray images and this is Dr. Stubbs's personal interpretation:     Normal x-rays right knee    Dr Stubbs independently interpreted the patient's MRI (performed by the Radiology department) by viewing the MRI images and this is Dr. Stubbs's personal interpretation:     MRI from 5/9/2025 of her right knee reviewed by me showing a lateral femoral bone infarct with intact overlying cartilage and no edema.  MRI images from September 9, 2019 of her right knee reviewed by me showing no bony infarct    I explained the patient I think all of her symptoms are coming from her patellofemoral joint.  She did not have the bone infarct back in 2019 but she has it now.  The patient does have a history of 15 years of inhaled corticosteroids for severe asthma and this is most likely the reason why she had the bony infarct.  I do not think a bony infarct is bothering her.  Her symptoms are not consistent with that.  Her symptoms are more consistent with patellofemoral pain.  I wrote her prescription for physical therapy for patellofemoral pain.  I offered her a steroid injection which she wants to hold off on.  I am recommending she use over-the-counter arch supports for her flatfeet.  I offered her to see an orthopedic oncologist to review the bony infarct but she did not feel that was necessary at this time nor do I.         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        The patient's height and weight were documented today in the vitals tab in the patient's EPIC chart, and the patient's BMI was calculated.  A follow up plan was then developed by Dr. Stubbs, per  mandated guidelines, based upon the patient's BMI and the follow up plan was documented in the \"Patient Instructions\" section of the chart.  Weight loss can be achieved by decreasing the amount of calories consumed daily and by increasing daily physical activity.  We recommend finding physical activities that you can participate in regularly and you enjoy which do not worsen your current joint pains.     "

## 2025-06-06 ENCOUNTER — TELEPHONE (OUTPATIENT)
Dept: PRIMARY CARE | Facility: CLINIC | Age: 35
End: 2025-06-06

## 2025-06-06 ENCOUNTER — APPOINTMENT (OUTPATIENT)
Dept: PRIMARY CARE | Facility: CLINIC | Age: 35
End: 2025-06-06
Payer: COMMERCIAL

## 2025-06-06 ENCOUNTER — EVALUATION (OUTPATIENT)
Dept: PHYSICAL THERAPY | Facility: CLINIC | Age: 35
End: 2025-06-06
Payer: COMMERCIAL

## 2025-06-06 VITALS
WEIGHT: 211.25 LBS | HEART RATE: 86 BPM | SYSTOLIC BLOOD PRESSURE: 102 MMHG | DIASTOLIC BLOOD PRESSURE: 70 MMHG | BODY MASS INDEX: 30.31 KG/M2 | OXYGEN SATURATION: 99 %

## 2025-06-06 DIAGNOSIS — B35.1 ONYCHOMYCOSIS: ICD-10-CM

## 2025-06-06 DIAGNOSIS — M22.2X1 PATELLOFEMORAL DISORDER OF RIGHT KNEE: ICD-10-CM

## 2025-06-06 DIAGNOSIS — K59.00 CONSTIPATION, UNSPECIFIED CONSTIPATION TYPE: ICD-10-CM

## 2025-06-06 DIAGNOSIS — J45.30 MILD PERSISTENT ASTHMA WITHOUT COMPLICATION (HHS-HCC): Primary | ICD-10-CM

## 2025-06-06 DIAGNOSIS — J45.20 MILD INTERMITTENT ASTHMA WITHOUT COMPLICATION (HHS-HCC): Primary | ICD-10-CM

## 2025-06-06 DIAGNOSIS — N63.24 MASS OF LOWER INNER QUADRANT OF LEFT BREAST: ICD-10-CM

## 2025-06-06 DIAGNOSIS — R10.2 PELVIC PAIN: Primary | ICD-10-CM

## 2025-06-06 PROCEDURE — 1036F TOBACCO NON-USER: CPT

## 2025-06-06 PROCEDURE — 97110 THERAPEUTIC EXERCISES: CPT | Mod: GP

## 2025-06-06 PROCEDURE — 3078F DIAST BP <80 MM HG: CPT

## 2025-06-06 PROCEDURE — 99214 OFFICE O/P EST MOD 30 MIN: CPT

## 2025-06-06 PROCEDURE — 97535 SELF CARE MNGMENT TRAINING: CPT | Mod: GP

## 2025-06-06 PROCEDURE — 97161 PT EVAL LOW COMPLEX 20 MIN: CPT | Mod: GP

## 2025-06-06 PROCEDURE — 3074F SYST BP LT 130 MM HG: CPT

## 2025-06-06 RX ORDER — ALBUTEROL SULFATE 90 UG/1
1 INHALANT RESPIRATORY (INHALATION) ONCE
OUTPATIENT
Start: 2025-06-06

## 2025-06-06 RX ORDER — ALBUTEROL SULFATE 0.83 MG/ML
3 SOLUTION RESPIRATORY (INHALATION) ONCE
OUTPATIENT
Start: 2025-06-06 | End: 2025-06-06

## 2025-06-06 NOTE — PROGRESS NOTES
Physical Therapy    EVALUATION AND TREATMENT    Name: Marisol Rebolledo  MRN: [id number]  : 1990  Today's Date: 25     Time Calculation  Start Time: 1008  Stop Time: 1135  Time Calculation (min): 87 min    Insurance:  Visit number: 1  Insurance Type: [employer]  Approved # of visits: 30  Authorization Needed: no  Cert Date Ends On: n/a    Precautions:  Precautions Comment: none     PMH: gestational hypertension, BPPV, bilateral tinnitus, TMJ, GERD  Fall Risk: no    Current Problem:  1. Pelvic pain  Referral to Physical Therapy    Follow Up In Physical Therapy      2. Constipation, unspecified constipation type  Referral to Physical Therapy    Follow Up In Physical Therapy          Subjective   General:  Reason for Referral: Pelvic pain and knee pain  Referred By: Meena Cope CNM  Chief complaint: R knee pain and pelvic floor dysfunction    Hx of right knee pain chronic. She has had imaging done in the past which said she had a cyst. Recent MRI showed bony infarct but treated conservatively. The pain rapidly worsened a month ago which her knee got stuck from an unknown reason. She has a history of flat feet, she does not use any orthotics in her shoes. Pain is located diffusely around her anteromedial compartment, described as dull. There is also pain in different spots along the length of her femur. Squatting, exercise, stairs, inclines, aggravates the knee. She has some instability and fear of falling. Standing: 3 minutes tolerance.   She does not take pain medicine      9 months old  Vaginal delivery - induced due to high blood pressure, prolonged push phase x 4 hours, with forceps delivery. 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 Incomplete emptying.   Normal bowel movements every 2-3 times per week, has to push   ED for constipation a few weeks after delivery, CT scan   Hx of hemorrhoids  [place] stool 1  Bleeding with painful bowel movements  Never had a colonoscopy   Pain:  Pain Assessment: 0-10  0-10 (Numeric) Pain Score: 6  Pain Type: Acute pain  Pain Location: Knee  Pain Orientation: Right    Objective   AROM:  Trunk mobility WFL  Knee ROM with pain >90 degrees   PROM/Joint Mobility:  Painful SI joint mobility   Painful R hip IR/ER   Strength:  Unable to perform >3 SLR   Hip abduction 4/5 R with pain   Hip abduction 4+/5 L  Hip extension (glute and hs) R/L 3/5   Special Tests:  + Ely R  + SLR R   + SCOUR R  - instability tests RLE   Palpation:  Pain on patellar poles, greater trochanter RLE  Pain at SI joint, posterior iliac crest, glute minimus/medius   Observation:   Visibly uncomfortable with sitting, has to sit in a variety of positions   Increased bilateral ankle pronation, - too many toes R/L  SL stance:  + trendelenberg R   B squat with pain limiting at 90 degrees, heavy reliance on UE for return to midline  SL squat not performed due to pain   Vaginal exam deferred due to time     Outcome Measure:   NIH CPSI pain 17 NIH CPSI urinary 5 NIH CPSI quality of life 9   LEFS 24/80    Assessment:    Pt presenting to the clinic 9 months post partum with pelvic floor dysfunction, mainly constipation and dyspareunia indicative of hypertonicity. She is also describing R knee pain and instability in her lower quarter but specifically in her knee. She is having radiating pain from her lower extremity. There is intact sensation and no evidence of claudication symptoms but patient unable to weight bear greater than 3 minutes. A vaginal exam and rectal exam were  deferred but will be performed at another visit - pt aware. Pt can benefit from continued PT to address pain etiology to improve constipation and weight bearing tolerance in her post partum state.     Plan:   PT Plan: Skilled PT  PT Frequency: 1 time per week  Duration: 12 weeks  Rehab Potential: Excellent  Plan of Care Agreement: Patient  Planned interventions include: biofeedback, cryotherapy, education/instruction, electrical stimulation, gait training, home program, hot pack, kinesiotaping, manual therapy, neuromuscular re-education, self care/home management, therapeutic activities, and therapeutic exercises.   Access Code: [email or web address]  URL: [email or web address]  Date: 06/09/2025  Prepared by: Jenna Bo    Exercises  - Seated Hamstring Stretch  - 1 x daily - 7 x weekly - 2 sets - 5 reps - 20-30 seconds  hold  - Clamshell  - 1 x daily - 7 x weekly - 2 sets - 10 reps - 5 seconds  hold  - Sidelying Hip Abduction  - 1 x daily - 7 x weekly - 2 sets - 10 reps - 5 seconds  hold  - Supine Hip Adduction Isometric with Ball  - 1 x daily - 7 x weekly - 2 sets - 10 reps - 5 seconds  hold    Careplan Goals:  1. Pt will be independent in HEP to maximize PT POC   2. Pt will be able to improve worst pain severity on NPRS by >2 points MCID   3. Pt will improve NIH CPSI by >50% raw score  4. Pt will improve LEFS by >9 points MCID   5. Pt will be able to stand >5 minutes in a grocery store line symptom free  6. Pt will be able to perform 10 SL squats with minimal pain or valgus   7. Pt will improve lateral hip stabilization and strength to 5/5 for knee pain reduction and optimal pelvic floor length tension relationship   8. Pt will demonstrate appropriate vaginal pelvic floor contraction and relaxation range of motion for reducing pelvic discomfort with vaginal exams and/or constipation   Francisca Bo, PT

## 2025-06-06 NOTE — TELEPHONE ENCOUNTER
Pt wanted to know about the pulmonary function test referral/ order she was wanting to call to get it scheduled today

## 2025-06-06 NOTE — PROGRESS NOTES
Subjective   Patient ID: Marisol Rebolledo is a 34 y.o. female who presents for Annual Exam (1 month follow up ).  HPI  - Pt ls by me 4/29 and was having chronic R knee pain. MRI R knee was done and showed a bone infarction in posterior lateral femoral condyle without associated reactive marrow changes. Saw Dr Stubbs who thinks all sx are coming from patellofemoral joint. Thinks perhaps the years of inhaled corticosteroids may be realted to the bony infarct but he does not feel thats causing her sx. Recommended arch supports and ortho oncoologist if she wanted but neither she nor him nor I felt that was necessary at this time.   - starting PT today for pelvic floor and knee   - knee is better than last visit   - whole leg is starting to hurting including ankle and foot   - cant get down on R knee to garden or squat   - has not gotten inserts for shoes yet but is wearing hokas     - noticed a small movable lump thats minimally tender in the inner lower quadrant of L breast   - noticed it when she was just finishing her period     - also had toenail fungus and tinea pedis-- gave clotrimazole and then tried to get her ciclopirox which was denied so given her clotrimazole spray.     Current Medications[1]   Surgical History[2]   Medical History[3]  Social History[4]   Family History[5]   Review of Systems  10 point ROS negative except as otherwise noted in the HPI.      Objective   /70   Pulse 86   Wt 95.8 kg (211 lb 4 oz)   SpO2 99%   BMI 30.31 kg/m²    Physical Exam  Constitutional:       Appearance: Normal appearance.   HENT:      Head: Normocephalic and atraumatic.   Eyes:      Extraocular Movements: Extraocular movements intact.      Pupils: Pupils are equal, round, and reactive to light.   Cardiovascular:      Rate and Rhythm: Normal rate and regular rhythm.      Pulses: Normal pulses.      Heart sounds: Normal heart sounds.   Pulmonary:      Effort: Pulmonary effort is normal.      Breath sounds: Normal  breath sounds.   Chest:   Breasts:     Right: Normal.      Left: Mass present.          Comments: 1-2cm oblong shaped mobile rubbery tender nodule in the L inner lower quadrant  Musculoskeletal:         General: Normal range of motion.      Right lower leg: No edema.      Left lower leg: No edema.   Skin:     General: Skin is warm and dry.      Findings: No rash.      Comments: Bilateral great toes with darkened thickened medial nail edges   Neurological:      General: No focal deficit present.      Mental Status: She is alert and oriented to person, place, and time.   Psychiatric:         Mood and Affect: Mood normal.         Behavior: Behavior normal.           Assessment/Plan   Problem List Items Addressed This Visit       Mild persistent asthma - Primary     Other Visit Diagnoses         Mass of lower inner quadrant of left breast      - small mobile rubbery mildly tender nodule on the left breast in lower inner quadrant.   - suspect fibrocystic or fibroadenoma but will get US breast to further characterize.   - educated on self breast exams and breast changes during cycle      Relevant Orders    BI US breast complete left     Continue spray/paint for toe fungus. R foot improving. Not much improvement on L foot yet     Continue PT for patellofemoral syndrome. Reviewed ortho recs       Discussed at visit any disease processes that were of concern as well as the risks, benefits and instructions on any new medication provided. Patient (and/or caretaker of patient if present) stated all questions were answered, and they voiced understanding of instructions.     Janice Louis PA-C          [1]   Current Outpatient Medications:     ciclopirox (Penlac) 8 % solution, Apply topically once daily at bedtime. Apply to affected nail daily and wipe off with alcohol every 7 days. Continue until resolved. Maximum of 48 weeks., Disp: 6.6 mL, Rfl: 0    clotrimazole 1 % spray,non-aerosol, Spray on affected twice daily until 1  week after resolution, Disp: 60 mL, Rfl: 1    budesonide-formoteroL (Symbicort) 160-4.5 mcg/actuation inhaler, Inhale 1 puff 2 times a day. Rinse mouth with water after use to reduce aftertaste and incidence of candidiasis. Do not swallow., Disp: 10.2 g, Rfl: 11  [2]   Past Surgical History:  Procedure Laterality Date    OTHER SURGICAL HISTORY  12/03/2014    Alveoloplasty With Tooth Extraction 1-3 Teeth Per Quadrant    OTHER SURGICAL HISTORY  07/30/2020    Endoscopy   [3]   Past Medical History:  Diagnosis Date    Acute maxillary sinusitis, unspecified 06/09/2016    Acute maxillary sinusitis    Acute upper respiratory infection, unspecified 10/28/2019    Viral URI with cough    Asthma     Breast tenderness in female 05/15/2023    Chest pain 05/15/2023    Dizziness 05/15/2023    Facial flushing 05/15/2023    Heavy periods 05/15/2023    Hordeolum externum left lower eyelid 06/10/2019    Hordeolum externum of left lower eyelid    Left ear pain 05/15/2023    Left flank pain 05/15/2023    Mild intermittent asthma with exacerbation ([facility]) 05/15/2023    Onychomycosis of toenail 05/15/2023    Other conditions influencing health status     No significant past medical history    Other specified disorders of eustachian tube, left ear 03/14/2020    Dysfunction of left eustachian tube    Pectoralis muscle strain 05/15/2023    Pelvic and perineal pain 04/09/2019    Pelvic pain in female    Personal history of other diseases of the respiratory system     History of sore throat    Personal history of other specified conditions 04/09/2019    History of abdominal pain    Pes planus of left foot 05/15/2023    Pneumonia, unspecified organism 03/01/2016    Community acquired pneumonia    Shortness of breath 05/15/2023    Spasm of muscle 11/13/2019    Unspecified nonsuppurative otitis media, unspecified ear 01/14/2019    Middle ear effusion    Vaginal discharge 05/15/2023    Vocal nodules in adults     Yeast infection 05/15/2023   [4]    Social History  Tobacco Use    Smoking status: Never    Smokeless tobacco: Never   Vaping Use    Vaping status: Never Used   Substance Use Topics    Alcohol use: Not Currently     Alcohol/week: 1.0 standard drink of alcohol     Types: 1 Glasses of wine per week    Drug use: Never   [5]   Family History  Problem Relation Name Age of Onset    Hyperlipidemia Mother      Hypertension Mother      Diabetes Father      Retinoblastoma Mother's Sister      Skin cancer Mother's Sister      Other (prediabetes) Maternal Grandmother      Brain Aneurysm Maternal Grandfather      Other (heart valve issues) Maternal Grandfather      Other (fatty liver) Maternal Grandfather      Diabetes Paternal Grandmother      Heart attack Paternal Grandmother      Spina bifida Cousin      Cancer Cousin      Breast cancer Maternal Great-Grandmother

## 2025-06-09 ASSESSMENT — PATIENT HEALTH QUESTIONNAIRE - PHQ9
2. FEELING DOWN, DEPRESSED OR HOPELESS: NOT AT ALL
SUM OF ALL RESPONSES TO PHQ9 QUESTIONS 1 AND 2: 0
1. LITTLE INTEREST OR PLEASURE IN DOING THINGS: NOT AT ALL

## 2025-06-09 ASSESSMENT — PAIN - FUNCTIONAL ASSESSMENT: PAIN_FUNCTIONAL_ASSESSMENT: 0-10

## 2025-06-09 ASSESSMENT — PAIN SCALES - GENERAL: PAINLEVEL_OUTOF10: 6

## 2025-06-09 ASSESSMENT — ENCOUNTER SYMPTOMS
LOSS OF SENSATION IN FEET: 0
DEPRESSION: 0
OCCASIONAL FEELINGS OF UNSTEADINESS: 0

## 2025-06-13 ENCOUNTER — HOSPITAL ENCOUNTER (OUTPATIENT)
Dept: RADIOLOGY | Facility: HOSPITAL | Age: 35
Discharge: HOME | End: 2025-06-13
Payer: COMMERCIAL

## 2025-06-13 ENCOUNTER — HOSPITAL ENCOUNTER (OUTPATIENT)
Dept: RESPIRATORY THERAPY | Facility: HOSPITAL | Age: 35
Discharge: HOME | End: 2025-06-13
Payer: COMMERCIAL

## 2025-06-13 ENCOUNTER — TELEPHONE (OUTPATIENT)
Dept: PRIMARY CARE | Facility: CLINIC | Age: 35
End: 2025-06-13

## 2025-06-13 VITALS — BODY MASS INDEX: 30.21 KG/M2 | HEIGHT: 70 IN | WEIGHT: 211 LBS

## 2025-06-13 DIAGNOSIS — N63.24 MASS OF LOWER INNER QUADRANT OF LEFT BREAST: Primary | ICD-10-CM

## 2025-06-13 DIAGNOSIS — N63.24 MASS OF LOWER INNER QUADRANT OF LEFT BREAST: ICD-10-CM

## 2025-06-13 DIAGNOSIS — J45.20 MILD INTERMITTENT ASTHMA WITHOUT COMPLICATION (HHS-HCC): ICD-10-CM

## 2025-06-13 PROCEDURE — 77062 BREAST TOMOSYNTHESIS BI: CPT

## 2025-06-13 PROCEDURE — 76982 USE 1ST TARGET LESION: CPT

## 2025-06-13 PROCEDURE — 77066 DX MAMMO INCL CAD BI: CPT

## 2025-06-13 PROCEDURE — 94726 PLETHYSMOGRAPHY LUNG VOLUMES: CPT | Performed by: INTERNAL MEDICINE

## 2025-06-13 PROCEDURE — 94060 EVALUATION OF WHEEZING: CPT | Performed by: INTERNAL MEDICINE

## 2025-06-13 PROCEDURE — 94729 DIFFUSING CAPACITY: CPT | Performed by: INTERNAL MEDICINE

## 2025-06-13 PROCEDURE — 76642 ULTRASOUND BREAST LIMITED: CPT

## 2025-06-15 LAB
MGC ASCENT PFT - FEV1 - POST: 3.35
MGC ASCENT PFT - FEV1 - PRE: 3.25
MGC ASCENT PFT - FEV1 - PREDICTED: 3.7
MGC ASCENT PFT - FVC - POST: 4.16
MGC ASCENT PFT - FVC - PRE: 4.15
MGC ASCENT PFT - FVC - PREDICTED: 4.46

## 2025-06-16 ENCOUNTER — TELEPHONE (OUTPATIENT)
Dept: PRIMARY CARE | Facility: CLINIC | Age: 35
End: 2025-06-16
Payer: COMMERCIAL

## 2025-06-16 ENCOUNTER — APPOINTMENT (OUTPATIENT)
Dept: PHYSICAL THERAPY | Facility: CLINIC | Age: 35
End: 2025-06-16
Payer: COMMERCIAL

## 2025-06-16 DIAGNOSIS — J98.4 RESTRICTIVE LUNG DISEASE: Primary | ICD-10-CM

## 2025-06-16 NOTE — TELEPHONE ENCOUNTER
Lm. Is confused, the pulmonologist told her to continue using the inhaler, that she needs it. So should she stop? Or Continue.

## 2025-06-18 ENCOUNTER — TELEPHONE (OUTPATIENT)
Dept: PRIMARY CARE | Facility: CLINIC | Age: 35
End: 2025-06-18

## 2025-06-18 ENCOUNTER — TELEMEDICINE (OUTPATIENT)
Dept: PRIMARY CARE | Facility: CLINIC | Age: 35
End: 2025-06-18
Payer: COMMERCIAL

## 2025-06-18 DIAGNOSIS — R94.2 ABNORMAL PFTS: Primary | ICD-10-CM

## 2025-06-18 PROCEDURE — 98013 SYNCH AUDIO-ONLY EST LOW 20: CPT

## 2025-06-18 NOTE — TELEPHONE ENCOUNTER
THIS MESSAGE WAS LEFT AT 3:40 YESTERDAY.    I have an appointment to see you next week but my anxiety is at the top right now.  I see that I do not have asthma BUT it says that I have RESTRICTIVE LUNG DISEASE and of course I googled it and it says that I have like 3-5 years to live.  Please call me.

## 2025-06-18 NOTE — PROGRESS NOTES
Subjective   Patient ID: Marisol Rebolledo is a 34 y.o. female who presents for No chief complaint on file..  HPI  - spoke with patient to further discuss breathing issues and PFTs  - at the end of her last visit, she mentioned shes been having some SAUNDERS for years, worse w humidity. Has used inhalers on and off. At least since 2014. She wanted repeat PFTs, has been awhlie and last was in primary care office  - PFTs showed normal spirometry with isolated mild reduced TLC. As such, we need to further assess for restrictive lung disease.   - she is very anxious about underlying cause. We discussed chest wall deformities, neuromuscular causes, obesity as other non pulm causes. Discussed it is reassuring her DCLO is normal.   Current Medications[1]   Surgical History[2]   Medical History[3]  Social History[4]   Family History[5]   Review of Systems  10 point ROS negative except as otherwise noted in the HPI.      Objective   There were no vitals taken for this visit.   Physical Exam    Limited exam due to nature of virtual visit.      Assessment/Plan   Problem List Items Addressed This Visit    None  Visit Diagnoses         Abnormal PFTs    -  Primary  - about 10 yrs of SAUNDERS. PFTs showed normal spirometry w mild reduced TLC with normal DCLO.   - need to r/o restrictive lung disease  - CT high res ordered   - may have see pulm and do additional labwork pending results    Relevant Orders    CT chest high resolution            Virtual or Telephone Consent    While technically available, the patient was unable or unwilling to consent to connect via audio/video telehealth technology; therefore, I performed this visit using a real-time audio only connection between Marisol Rebolledo & Janice Louis PA-C.  Verbal consent was requested and obtained from Marisol Rebolledo on this date, 06/18/25 for a telehealth visit and the patient's location was confirmed at the time of the visit.    I discussed with the patient the potential benefits  "and risks of the use of telephone or video-conferencing that differ from in-person services (e.g., limits to patient confidentiality, limitations on the provider’s ability to observe the patient, limitations on the diagnostic tools available). I explained to the patient that I may determine at any point that telehealth services are not appropriate based on the patient’s circumstances and either party may therefore end the service to schedule an alternative in-person service or contact 911 to address a medical emergency. With the understanding of these risks, benefits and alternatives, the patient agreed to use the telephone or video-conferencing platform selected for this virtual session and further the patient confirmed his/her understanding that the services do not guarantee a specific outcome or recovery.  \"Spent 25 minutes with patient on phone discussing health concerns.\"    Discussed at visit any disease processes that were of concern as well as the risks, benefits and instructions on any new medication provided. Patient (and/or caretaker of patient if present) stated all questions were answered, and they voiced understanding of instructions.     Janice Louis PA-C          [1]   Current Outpatient Medications:     budesonide-formoteroL (Symbicort) 160-4.5 mcg/actuation inhaler, Inhale 1 puff 2 times a day. Rinse mouth with water after use to reduce aftertaste and incidence of candidiasis. Do not swallow., Disp: 10.2 g, Rfl: 11    ciclopirox (Penlac) 8 % solution, Apply topically once daily at bedtime. Apply to affected nail daily and wipe off with alcohol every 7 days. Continue until resolved. Maximum of 48 weeks., Disp: 6.6 mL, Rfl: 0    clotrimazole 1 % spray,non-aerosol, Spray on affected twice daily until 1 week after resolution, Disp: 60 mL, Rfl: 1  [2]   Past Surgical History:  Procedure Laterality Date    OTHER SURGICAL HISTORY  12/03/2014    Alveoloplasty With Tooth Extraction 1-3 Teeth Per Quadrant "    OTHER SURGICAL HISTORY  07/30/2020    Endoscopy   [3]   Past Medical History:  Diagnosis Date    Acute maxillary sinusitis, unspecified 06/09/2016    Acute maxillary sinusitis    Acute upper respiratory infection, unspecified 10/28/2019    Viral URI with cough    Asthma     Breast tenderness in female 05/15/2023    Chest pain 05/15/2023    Dizziness 05/15/2023    Facial flushing 05/15/2023    Heavy periods 05/15/2023    Hordeolum externum left lower eyelid 06/10/2019    Hordeolum externum of left lower eyelid    Left ear pain 05/15/2023    Left flank pain 05/15/2023    Mild intermittent asthma with exacerbation ([facility]) 05/15/2023    Onychomycosis of toenail 05/15/2023    Other conditions influencing health status     No significant past medical history    Other specified disorders of eustachian tube, left ear 03/14/2020    Dysfunction of left eustachian tube    Pectoralis muscle strain 05/15/2023    Pelvic and perineal pain 04/09/2019    Pelvic pain in female    Personal history of other diseases of the respiratory system     History of sore throat    Personal history of other specified conditions 04/09/2019    History of abdominal pain    Pes planus of left foot 05/15/2023    Pneumonia, unspecified organism 03/01/2016    Community acquired pneumonia    Shortness of breath 05/15/2023    Spasm of muscle 11/13/2019    Unspecified nonsuppurative otitis media, unspecified ear 01/14/2019    Middle ear effusion    Vaginal discharge 05/15/2023    Vocal nodules in adults     Yeast infection 05/15/2023   [4]   Social History  Tobacco Use    Smoking status: Never    Smokeless tobacco: Never   Vaping Use    Vaping status: Never Used   Substance Use Topics    Alcohol use: Not Currently     Alcohol/week: 1.0 standard drink of alcohol     Types: 1 Glasses of wine per week    Drug use: Never   [5]   Family History  Problem Relation Name Age of Onset    Hyperlipidemia Mother      Hypertension Mother      Diabetes Father       Retinoblastoma Mother's Sister      Skin cancer Mother's Sister      Other (prediabetes) Maternal Grandmother      Brain Aneurysm Maternal Grandfather      Other (heart valve issues) Maternal Grandfather      Other (fatty liver) Maternal Grandfather      Diabetes Paternal Grandmother      Heart attack Paternal Grandmother      Spina bifida Cousin      Cancer Cousin      Breast cancer Maternal Great-Grandmother
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
## 2025-06-19 ENCOUNTER — TELEPHONE (OUTPATIENT)
Dept: PRIMARY CARE | Facility: CLINIC | Age: 35
End: 2025-06-19
Payer: COMMERCIAL

## 2025-06-19 NOTE — TELEPHONE ENCOUNTER
Got an apt for CT on July 3rd. They couldn't get her in any sooner. That is the day before a holiday, she doesn't think that the 3rd is a good day. She wants to know if you have any pull in order to get it scheduled sooner.

## 2025-06-20 ENCOUNTER — APPOINTMENT (OUTPATIENT)
Dept: PHYSICAL THERAPY | Facility: CLINIC | Age: 35
End: 2025-06-20
Payer: COMMERCIAL

## 2025-06-26 ENCOUNTER — APPOINTMENT (OUTPATIENT)
Dept: PRIMARY CARE | Facility: CLINIC | Age: 35
End: 2025-06-26
Payer: COMMERCIAL

## 2025-06-27 ENCOUNTER — APPOINTMENT (OUTPATIENT)
Dept: PHYSICAL THERAPY | Facility: CLINIC | Age: 35
End: 2025-06-27
Payer: COMMERCIAL

## 2025-06-27 ENCOUNTER — TELEPHONE (OUTPATIENT)
Dept: PRIMARY CARE | Facility: CLINIC | Age: 35
End: 2025-06-27
Payer: COMMERCIAL

## 2025-07-01 ENCOUNTER — APPOINTMENT (OUTPATIENT)
Dept: PHYSICAL THERAPY | Facility: CLINIC | Age: 35
End: 2025-07-01
Payer: COMMERCIAL

## 2025-07-03 ENCOUNTER — APPOINTMENT (OUTPATIENT)
Dept: RADIOLOGY | Facility: CLINIC | Age: 35
End: 2025-07-03
Payer: COMMERCIAL

## 2025-07-11 ENCOUNTER — TREATMENT (OUTPATIENT)
Dept: PHYSICAL THERAPY | Facility: CLINIC | Age: 35
End: 2025-07-11
Payer: COMMERCIAL

## 2025-07-11 DIAGNOSIS — K59.00 CONSTIPATION, UNSPECIFIED CONSTIPATION TYPE: ICD-10-CM

## 2025-07-11 DIAGNOSIS — R10.2 PELVIC PAIN: ICD-10-CM

## 2025-07-11 PROCEDURE — 97110 THERAPEUTIC EXERCISES: CPT | Mod: GP

## 2025-07-11 PROCEDURE — 97535 SELF CARE MNGMENT TRAINING: CPT | Mod: GP

## 2025-07-11 PROCEDURE — 97140 MANUAL THERAPY 1/> REGIONS: CPT | Mod: GP

## 2025-07-11 NOTE — PROGRESS NOTES
PHYSICAL THERAPY   TREATMENT NOTE    Patient Name:  Marisol Rebolledo   Patient MRN: [id number]  Date: 07/11/25    Time Calculation  Start Time: 1445  Stop Time: 1543  Time Calculation (min): 58 min    Insurance:  Visit number: 2  Insurance Type: [employer]  Approved # of visits: 30  Authorization Needed: no  Cert Date Ends On: n/a    Precautions:  PMH: gestational hypertension, BPPV, bilateral tinnitus, TMJ, GERD  Fall Risk: no    General:  Reason for visit: pelvic pain  Referred by: Meena Cope CNM    Therapy diagnoses:   1. Pelvic pain  Follow Up In Physical Therapy      2. Constipation, unspecified constipation type  Follow Up In Physical Therapy           Subjective:  Inconsistent HEP performance   She felt like the exercises were helpful for the knee when she would do them   Labial swelling post intercourse   Pain (0-10):      Objective:    Treatment Performed:   Therapeutic Exercise:  10 minutes  See below   Therapeutic Activity:   minutes     Self Care: 15 minutes  Discussed intercourse pain reduction strategies   Manual Therapy: 33 minutes  Kinesiotape patellar strap with medial patellar glide B  External and internal assessment explained. Verbal consent obtained to proceed with vaginal or rectal exam and consented for the treatment approaches today. Patient understands they have power and right to stop examination at any time. A chaperone was offered to patient and patient declined.    Vaginal Observation:  Voluntary Contraction: + core contraction   Voluntary Relaxation: +  Involuntary Contraction: +   Involuntary Relaxation: +  *mirror given to patient to see vaginal canal*  Healed perineal scarring, some pain with stretching of transverse perineum and scar   No visible descent into the pelvic canal    External vaginal palpation: no tenderness  1 o'clock (ischiocavernosus)  2 o'clock (bulbocavernosus)  3 o'clock (superficial transverse perineal)  4 o'clock (pubococcygeus)  5 o'clock (iliococcygeus)  6  o'clock (coccyx)  7 o'clock (iliococcygeus)  8 o'clock (pubococcygeus)  9 o'clock (superficial transverse perineal)  10 o'clock (bulbocavernosus)  11 o'clock (ischiocavernosus)  12 o'clock (pubic symphysis inferior angle)  Obturator:  Piriformis:  Hamstring:  Adductor:    Internal vaginal palpation: L > R sided tenderness  1 o'clock (ischiocavernosus)  2 o'clock (bulbocavernosus)  3 o'clock (superficial transverse perineal)  4 o'clock (pubococcygeus)  5 o'clock (iliococcygeus)  6 o'clock (coccyx)  7 o'clock (iliococcygeus)  8 o'clock (pubococcygeus)  9 o'clock (superficial transverse perineal)  10 o'clock (bulbocavernosus)  11 o'clock (ischiocavernosus)  12 o'clock (pubic symphysis inferior angle)  Obturator:  Piriformis:      Pelvic Floor MMT Grade  0/zero: no palpable contraction/squeeze  1/trace: flicker of squeeze or contraction  2/poor: squeeze pressure asymmetrical or felt at various points- no lift or displacement  3/fair: squeeze pressure (contraction) and lift or displacement  4/good: squeeze pressure (contraction) and lift or displacement from anterior, posterior, and side walls  5/strong: full circumference of finger compressed, displaced with an inward pull    [place] PERF(Power/Endurance/Repetitions/Fast Twitch)  3/5/2/5    Neuromuscular Re-education:   minutes    Gait Training:    minutes    Modalities:    minutes    Dry Needling:   minutes    Assessment:    Pt with increased core contraction during pelvic floor activation. Minimal hypertonicity ut rather dyskinesia and or pain with perineal stretching  Pain levels were unchanged at the end of the treatment.    Plan:  Continue current exercise program  Increase home perineal stretching   Work on rectal down training after a few sessions

## 2025-07-21 ENCOUNTER — TELEPHONE (OUTPATIENT)
Dept: PRIMARY CARE | Facility: CLINIC | Age: 35
End: 2025-07-21
Payer: COMMERCIAL

## 2025-07-21 NOTE — TELEPHONE ENCOUNTER
"Per vm, \"Hi Janice, we had the house inspection and they found lots of live bats and guano. Could that be the cause of my URI? I just wanted you to know what they found and see your thoughts. Thank you.\"   "

## 2025-08-18 ENCOUNTER — TELEPHONE (OUTPATIENT)
Dept: OBSTETRICS AND GYNECOLOGY | Facility: CLINIC | Age: 35
End: 2025-08-18
Payer: COMMERCIAL

## 2025-08-19 ENCOUNTER — OFFICE VISIT (OUTPATIENT)
Dept: OBSTETRICS AND GYNECOLOGY | Facility: CLINIC | Age: 35
End: 2025-08-19
Payer: COMMERCIAL

## 2025-08-19 VITALS — BODY MASS INDEX: 29.84 KG/M2 | SYSTOLIC BLOOD PRESSURE: 118 MMHG | DIASTOLIC BLOOD PRESSURE: 70 MMHG | WEIGHT: 208 LBS

## 2025-08-19 DIAGNOSIS — N94.9 ADNEXAL FULLNESS: ICD-10-CM

## 2025-08-19 DIAGNOSIS — R10.2 PELVIC PRESSURE IN FEMALE: Primary | ICD-10-CM

## 2025-08-19 DIAGNOSIS — K59.00 CONSTIPATION, UNSPECIFIED CONSTIPATION TYPE: ICD-10-CM

## 2025-08-19 DIAGNOSIS — R10.2 PELVIC PAIN: ICD-10-CM

## 2025-08-19 LAB
POC APPEARANCE, URINE: CLEAR
POC BILIRUBIN, URINE: NEGATIVE
POC BLOOD, URINE: NEGATIVE
POC COLOR, URINE: NORMAL
POC GLUCOSE, URINE: NEGATIVE MG/DL
POC KETONES, URINE: NEGATIVE MG/DL
POC LEUKOCYTES, URINE: NEGATIVE
POC NITRITE,URINE: NEGATIVE
POC PH, URINE: 5.5 PH
POC PROTEIN, URINE: NEGATIVE MG/DL
POC SPECIFIC GRAVITY, URINE: 1.02
POC UROBILINOGEN, URINE: 0.2 EU/DL
PREGNANCY TEST URINE, POC: NEGATIVE

## 2025-08-19 PROCEDURE — 1036F TOBACCO NON-USER: CPT | Performed by: NURSE PRACTITIONER

## 2025-08-19 PROCEDURE — 81025 URINE PREGNANCY TEST: CPT | Performed by: NURSE PRACTITIONER

## 2025-08-19 PROCEDURE — 3078F DIAST BP <80 MM HG: CPT | Performed by: NURSE PRACTITIONER

## 2025-08-19 PROCEDURE — 81002 URINALYSIS NONAUTO W/O SCOPE: CPT | Performed by: NURSE PRACTITIONER

## 2025-08-19 PROCEDURE — 99214 OFFICE O/P EST MOD 30 MIN: CPT | Performed by: NURSE PRACTITIONER

## 2025-08-19 PROCEDURE — 3074F SYST BP LT 130 MM HG: CPT | Performed by: NURSE PRACTITIONER

## 2025-08-21 LAB
BACTERIA UR CULT: NORMAL
BV SCORE VAG QL: NORMAL

## 2025-09-04 ENCOUNTER — HOSPITAL ENCOUNTER (OUTPATIENT)
Dept: RADIOLOGY | Facility: HOSPITAL | Age: 35
Discharge: HOME | End: 2025-09-04
Payer: COMMERCIAL

## 2025-09-04 DIAGNOSIS — R10.2 PELVIC PRESSURE IN FEMALE: ICD-10-CM

## 2025-09-04 DIAGNOSIS — R10.2 PELVIC PAIN: ICD-10-CM

## 2025-09-04 DIAGNOSIS — N94.9 ADNEXAL FULLNESS: ICD-10-CM

## 2025-09-04 PROCEDURE — 76830 TRANSVAGINAL US NON-OB: CPT

## 2025-09-24 ENCOUNTER — APPOINTMENT (OUTPATIENT)
Dept: OBSTETRICS AND GYNECOLOGY | Facility: CLINIC | Age: 35
End: 2025-09-24
Payer: COMMERCIAL

## 2026-04-10 ENCOUNTER — APPOINTMENT (OUTPATIENT)
Dept: OBSTETRICS AND GYNECOLOGY | Facility: CLINIC | Age: 36
End: 2026-04-10
Payer: COMMERCIAL